# Patient Record
Sex: MALE | Race: BLACK OR AFRICAN AMERICAN | NOT HISPANIC OR LATINO | Employment: FULL TIME | URBAN - METROPOLITAN AREA
[De-identification: names, ages, dates, MRNs, and addresses within clinical notes are randomized per-mention and may not be internally consistent; named-entity substitution may affect disease eponyms.]

---

## 2017-01-17 ENCOUNTER — OFFICE VISIT (OUTPATIENT)
Dept: URGENT CARE | Facility: URGENT CARE | Age: 47
End: 2017-01-17
Payer: COMMERCIAL

## 2017-01-17 VITALS
WEIGHT: 165 LBS | HEART RATE: 84 BPM | DIASTOLIC BLOOD PRESSURE: 65 MMHG | TEMPERATURE: 99 F | SYSTOLIC BLOOD PRESSURE: 106 MMHG | BODY MASS INDEX: 23.68 KG/M2 | OXYGEN SATURATION: 98 %

## 2017-01-17 DIAGNOSIS — F17.200 TOBACCO USE DISORDER: ICD-10-CM

## 2017-01-17 DIAGNOSIS — R50.9 FEVER, UNSPECIFIED: ICD-10-CM

## 2017-01-17 DIAGNOSIS — R05.8 PRODUCTIVE COUGH: Primary | ICD-10-CM

## 2017-01-17 DIAGNOSIS — R06.2 WHEEZING: ICD-10-CM

## 2017-01-17 LAB
DEPRECATED S PYO AG THROAT QL EIA: NORMAL
FLUAV+FLUBV AG SPEC QL: NORMAL
FLUAV+FLUBV AG SPEC QL: NORMAL
MICRO REPORT STATUS: NORMAL
SPECIMEN SOURCE: NORMAL
SPECIMEN SOURCE: NORMAL

## 2017-01-17 PROCEDURE — 87880 STREP A ASSAY W/OPTIC: CPT | Performed by: PHYSICIAN ASSISTANT

## 2017-01-17 PROCEDURE — 87081 CULTURE SCREEN ONLY: CPT | Performed by: PHYSICIAN ASSISTANT

## 2017-01-17 PROCEDURE — 99214 OFFICE O/P EST MOD 30 MIN: CPT | Performed by: PHYSICIAN ASSISTANT

## 2017-01-17 PROCEDURE — 87804 INFLUENZA ASSAY W/OPTIC: CPT | Performed by: PHYSICIAN ASSISTANT

## 2017-01-17 RX ORDER — ALBUTEROL SULFATE 90 UG/1
2 AEROSOL, METERED RESPIRATORY (INHALATION) EVERY 6 HOURS PRN
Qty: 1 INHALER | Refills: 0 | Status: SHIPPED | OUTPATIENT
Start: 2017-01-17 | End: 2021-09-16

## 2017-01-17 RX ORDER — PREDNISONE 20 MG/1
40 TABLET ORAL DAILY
Qty: 10 TABLET | Refills: 0 | Status: SHIPPED | OUTPATIENT
Start: 2017-01-17 | End: 2017-01-22

## 2017-01-17 RX ORDER — SULFAMETHOXAZOLE/TRIMETHOPRIM 800-160 MG
1 TABLET ORAL 2 TIMES DAILY
Qty: 20 TABLET | Refills: 0 | Status: SHIPPED | OUTPATIENT
Start: 2017-01-17 | End: 2018-10-23

## 2017-01-17 NOTE — Clinical Note
[place] [facility]  [address] [address]  [phone]      January 17, 2017    RE:  Todd Bowden                                                                                                                                                       [address] [place] [address]            To whom it may concern:    Todd Bowden was seen in clinic today for illness.  He may return to work when he has been fever free for 24 hours.          Sincerely,        Ngoc Huertas    [place] [facility]

## 2017-01-18 NOTE — PROGRESS NOTES
SUBJECTIVE:   Todd Bowden is a 46 year old male presenting with a chief complaint of:  1) productive cough for 3 days  2) sore throat  3) body aches  4) ear pain    Onset of symptoms was 3 day(s) ago.  Course of illness is worsening.    Severity moderate  Current and Associated symptoms: as above  Treatment measures tried include as above.  Predisposing factors include tobacco abuse.    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     Skin desquamation 5/28/2013     Patient Active Problem List   Diagnosis     Insomnia     Stress-induced cardiomyopathy     CARDIOVASCULAR SCREENING; LDL GOAL LESS THAN 160     Tinea pedis     Smoking     HSV (herpes simplex virus) infection     Adjustment disorder with mixed anxiety and depressed mood     Social History   Substance Use Topics     Smoking status: Current Every Day Smoker -- 0.50 packs/day for 20 years     Types: Cigarettes     Smokeless tobacco: Never Used      Comment: on patches     Alcohol Use: No       ROS:  CONSTITUTIONAL:NEGATIVE for fever, chills, change in weight  INTEGUMENTARY/SKIN: NEGATIVE for worrisome rashes, moles or lesions  EYES: NEGATIVE for vision changes or irritation  ENT/MOUTH: as per HPI  RESP:as per HPI  CV: NEGATIVE for chest pain, palpitations or peripheral edema  GI: NEGATIVE for nausea, abdominal pain, heartburn, or change in bowel habits  MUSCULOSKELETAL: as per HPI    OBJECTIVE  :/65 mmHg  Pulse 84  Temp(Src) 99  F (37.2  C) (Oral)  Wt 165 lb (74.844 kg)  SpO2 98%  GENERAL APPEARANCE: healthy, alert and no distress  EYES: EOMI,  PERRL, conjunctiva  clear  HENT: ear canals and TM's normal.  Nose and mouth without ulcers, erythema or lesions  NECK: supple, nontender, no lymphadenopathy  RESP: a few scattered rhonchi and wheese  CV: regular rates and rhythm, normal S1 S2, no murmur noted  ABDOMEN:  soft, nontender, no HSM or masses and bowel sounds normal  NEURO: Normal strength and tone, sensory exam grossly normal,  normal speech and mentation  SKIN: no suspicious lesions or rashes    (R05) Productive cough  (primary encounter diagnosis)  Comment:   Plan: sulfamethoxazole-trimethoprim (BACTRIM         DS/SEPTRA DS) 800-160 MG per tablet            (R50.9) Fever, unspecified  Comment:   Plan: Influenza A/B antigen, Strep, Rapid Screen,         Beta strep group A culture            (R06.2) Wheezing  Comment:   Plan: predniSONE (DELTASONE) 20 MG tablet, albuterol         (PROAIR HFA/PROVENTIL HFA/VENTOLIN HFA) 108 (90        BASE) MCG/ACT Inhaler            (F17.200) Tobacco use disorder  Comment:   Plan: discussed tobacco cessation    F/U with PCP for re-check in 7-10 days, sooner should symptoms persist or worsen.    Patient expresses understanding and agreement with the assessment and plan as above.

## 2017-01-18 NOTE — NURSING NOTE
"Chief Complaint   Patient presents with     Urgent Care     x3 days fever cough, congestion, body aches, ear ache        Initial /65 mmHg  Pulse 84  Temp(Src) 99  F (37.2  C) (Oral)  Wt 165 lb (74.844 kg)  SpO2 98% Estimated body mass index is 23.68 kg/(m^2) as calculated from the following:    Height as of 10/14/14: 5' 10\" (1.778 m).    Weight as of this encounter: 165 lb (74.844 kg).  BP completed using cuff size: day Can MA      "

## 2017-01-19 LAB
BACTERIA SPEC CULT: NORMAL
MICRO REPORT STATUS: NORMAL
SPECIMEN SOURCE: NORMAL

## 2018-10-23 ENCOUNTER — OFFICE VISIT (OUTPATIENT)
Dept: URGENT CARE | Facility: URGENT CARE | Age: 48
End: 2018-10-23
Payer: COMMERCIAL

## 2018-10-23 VITALS
HEART RATE: 75 BPM | DIASTOLIC BLOOD PRESSURE: 66 MMHG | WEIGHT: 162.44 LBS | BODY MASS INDEX: 23.31 KG/M2 | SYSTOLIC BLOOD PRESSURE: 103 MMHG | TEMPERATURE: 97.2 F

## 2018-10-23 DIAGNOSIS — K21.00 GASTROESOPHAGEAL REFLUX DISEASE WITH ESOPHAGITIS: Primary | ICD-10-CM

## 2018-10-23 PROCEDURE — 99213 OFFICE O/P EST LOW 20 MIN: CPT | Performed by: PHYSICIAN ASSISTANT

## 2018-10-23 NOTE — PATIENT INSTRUCTIONS
(K21.0) Gastroesophageal reflux disease with esophagitis  (primary encounter diagnosis)  Comment: likely secondary to clindamycin and ibuprofen.    Plan: ranitidine (ZANTAC) 150 MG tablet        Take pills with food.  Avoid lying down for at least 30 minutes after taking the medication.     Follow up with your primary clinic for re-check     Also, follow up with dentist to ensure the dental infection is cleared.

## 2018-10-23 NOTE — LETTER
[place] [facility]  [address] [address]  [phone]      October 23, 2018    RE:  Todd Bowden                                                                                                                                                       [address] [facility] [address]            To whom it may concern:    Todd Bowden was seen in clinic today for illness.  He has been ill for past 2 days.   He may return to work tomorrow.          Sincerely,        Ngoc Huertas [facility]

## 2018-10-23 NOTE — MR AVS SNAPSHOT
After Visit Summary   10/23/2018    Todd Bowden    MRN: [id number]           Patient Information     Date Of Birth          1970        Visit Information        Provider Department      10/23/2018 1:00 PM Ngoc Bolton PA-C [facility]        Today's Diagnoses     Gastroesophageal reflux disease with esophagitis    -  1      Care Instructions    (K21.0) Gastroesophageal reflux disease with esophagitis  (primary encounter diagnosis)  Comment: likely secondary to clindamycin and ibuprofen.    Plan: ranitidine (ZANTAC) 150 MG tablet        Take pills with food.  Avoid lying down for at least 30 minutes after taking the medication.     Follow up with your primary clinic for re-check     Also, follow up with dentist to ensure the dental infection is cleared.                    Follow-ups after your visit        Who to contact     If you have questions or need follow up information about today's clinic visit or your schedule please contact [facility] directly at [phone].  Normal or non-critical lab and imaging results will be communicated to you by [employer], letter or phone within 4 business days after the clinic has received the results. If you do not hear from us within 7 days, please contact the clinic through [employer] or phone. If you have a critical or abnormal lab result, we will notify you by phone as soon as possible.  Submit refill requests through [employer] or call your pharmacy and they will forward the refill request to us. Please allow 3 business days for your refill to be completed.          Additional Information About Your Visit        [employer] Information     [employer] gives you secure access to your electronic health record. If you see a primary care provider, you can also send messages to your care team and make appointments. If you have questions, please call your primary care clinic.  If you do not have a primary  care provider, please call [phone] and they will assist you.        Care EveryWhere ID     This is your Care EveryWhere ID. This could be used by other organizations to access your [place] medical records  [id number]        Your Vitals Were     Pulse Temperature BMI (Body Mass Index)             75 97.2  F (36.2  C) (Oral) 23.31 kg/m2          Blood Pressure from Last 3 Encounters:   10/23/18 103/66   01/17/17 106/65   11/14/16 92/60    Weight from Last 3 Encounters:   10/23/18 162 lb 7 oz (73.7 kg)   01/17/17 165 lb (74.8 kg)   11/14/16 160 lb 12.8 oz (72.9 kg)              Today, you had the following     No orders found for display         Today's Medication Changes          These changes are accurate as of 10/23/18  2:07 PM.  If you have any questions, ask your nurse or doctor.               Start taking these medicines.        Dose/Directions    ranitidine 150 MG tablet   Commonly known as:  ZANTAC   Used for:  Gastroesophageal reflux disease with esophagitis   Started by:  Ngoc Bolton PA-C        Dose:  150 mg   Take 1 tablet (150 mg) by mouth 2 times daily   Quantity:  60 tablet   Refills:  0         These medicines have changed or have updated prescriptions.        Dose/Directions    albuterol 108 (90 Base) MCG/ACT inhaler   Commonly known as:  PROAIR HFA/PROVENTIL HFA/VENTOLIN HFA   This may have changed:  Another medication with the same name was removed. Continue taking this medication, and follow the directions you see here.   Used for:  Wheezing   Changed by:  Ngoc Bolton PA-C        Dose:  2 puff   Inhale 2 puffs into the lungs every 6 hours as needed for shortness of breath / dyspnea or wheezing   Quantity:  1 Inhaler   Refills:  0         Stop taking these medicines if you haven't already. Please contact your care team if you have questions.     doxycycline 100 MG capsule   Commonly known as:  VIBRAMYCIN   Stopped by:  Ngoc Bolton PA-C            predniSONE 20 MG tablet   Commonly known as:  DELTASONE   Stopped by:  Ngoc Bolton PA-C           sulfamethoxazole-trimethoprim 800-160 MG per tablet   Commonly known as:  BACTRIM DS/SEPTRA DS   Stopped by:  Ngoc Bolton PA-C           zolpidem 10 MG tablet   Commonly known as:  AMBIEN   Stopped by:  Ngoc Bolton PA-C                Where to get your medicines      These medications were sent to [email or web address] [address] [place] [address] AT [address] [address]    Hours:  24-hours Phone:  [phone]     ranitidine 150 MG tablet                Primary Care Provider Office Phone # Fax #    Floyd Balderas [phone] [address] [facility] [address]  [place] [address]        Equal Access to Services     [facility] Center[facility] : Hadii aad ku hadasho Soomaali, waaxda luqadaha, qaybta kaalmada adeegyada, waxay zainin hayaan christel mendoza . So [facility] [place] [phone].    ATENCIÓN: Si habla español, tiene a wild disposición servicios gratuitos de asistencia lingüística. Merlin al [phone].    We comply with applicable federal civil rights laws and [place] laws. We do not discriminate on the basis of race, color, national origin, age, disability, sex, sexual orientation, or gender identity.            Thank you!     Thank you for choosing [facility]  for your care. Our goal is always to provide you with excellent care. Hearing back from our patients is one way we can continue to improve our services. Please take a few minutes to complete the written survey that you may receive in the mail after your visit with us. Thank you!             Your Updated Medication List - Protect others around you: Learn how to safely use, store and throw away your medicines at www.disposemymeds.org.          This list is accurate as of 10/23/18  2:07 PM.  Always use your most recent med list.                    Brand Name Dispense Instructions for use Diagnosis    acyclovir 400 MG tablet    ZOVIRAX    15 tablet    Take 1 tablet (400 mg) by mouth 3 times daily    HSV (herpes simplex virus) infection       albuterol 108 (90 Base) MCG/ACT inhaler    PROAIR HFA/PROVENTIL HFA/VENTOLIN HFA    1 Inhaler    Inhale 2 puffs into the lungs every 6 hours as needed for shortness of breath / dyspnea or wheezing    Wheezing       buPROPion 150 MG 12 hr tablet    WELLBUTRIN SR    60 tablet    Take 1 tablet once daily and increase to 1 tablet twice daily after 4 to 7 days    Tobacco dependence       HYDROcodone-acetaminophen 5-325 MG per tablet    NORCO    15 tablet    Take 1-2 tablets by mouth every 4 hours as needed for moderate to severe pain        ibuprofen 200 MG tablet    ADVIL/MOTRIN    120 tablet    Take 2-4 tablets (400-800 mg) by mouth 3 times daily as needed For arthritis pain.    Arthritis       nicotine 21 MG/24HR 24 hr patch    NICODERM CQ    30 patch    Place 1 patch onto the skin every 24 hours    Tobacco dependence       nicotine polacrilex 2 MG gum    EQL NICOTINE POLACRILEX    30 tablet    Place 1 each (2 mg) inside cheek as needed for smoking cessation    Tobacco dependence       ranitidine 150 MG tablet    ZANTAC    60 tablet    Take 1 tablet (150 mg) by mouth 2 times daily    Gastroesophageal reflux disease with esophagitis

## 2018-10-23 NOTE — PROGRESS NOTES
SUBJECTIVE:   Todd Bowden is a 48 year old male presenting with a chief complaint of presenting with a chief complaint of:  Chest discomfort, a burning feeling, like something is stuck in his chest since yesterday.  He has been on ibuprofen 800mg and clindamycin 300mg QID since 10/15/18  Tooth was extracted on 10/17    Denies any fevers, but does complain of chills alternating with subjective hot flashes.          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     Sepsis(995.91) 4/26/2013     Sickle cell anemia (H)     screening performed at [facility] were negative 04/2013     Skin desquamation 5/28/2013     Stress-induced cardiomyopathy 5/10/2013     Patient Active Problem List   Diagnosis     Insomnia     Stress-induced cardiomyopathy     CARDIOVASCULAR SCREENING; LDL GOAL LESS THAN 160     Tinea pedis     Smoking     HSV (herpes simplex virus) infection     Adjustment disorder with mixed anxiety and depressed mood     Social History   Substance Use Topics     Smoking status: Current Every Day Smoker     Packs/day: 0.50     Years: 20.00     Types: Cigarettes     Smokeless tobacco: Never Used      Comment: on patches     Alcohol use No           ROS:  CONSTITUTIONAL:as per HPI  INTEGUMENTARY/SKIN: NEGATIVE for worrisome rashes, moles or lesions  EYES: NEGATIVE for vision changes or irritation  ENT/MOUTH: as per HPI  RESP:NEGATIVE for significant cough or SOB  CV: NEGATIVE for chest pain, palpitations or peripheral edema  GI: as per HPI  MUSCULOSKELETAL: NEGATIVE for significant arthralgias or myalgia  NEURO: NEGATIVE for weakness, dizziness or paresthesias  Review of systems negative except as above      OBJECTIVE  :/66  Pulse 75  Temp 97.2  F (36.2  C) (Oral)  Wt 162 lb 7 oz (73.7 kg)   BMI 23.31 kg/m2       GENERAL APPEARANCE: healthy, alert and no distress  EYES: EOMI,  PERRL, conjunctiva clear  HENT: ear canals and TM's normal.  Nose and mouth without ulcers, erythema or lesions  NECK: supple, nontender, no lymphadenopathy  RESP: lungs clear to auscultation - no rales, rhonchi or wheezes  CV: regular rates and rhythm, normal S1 S2, no murmur noted  ABDOMEN:  soft, nontender, no HSM or masses and bowel sounds normal  ABDOMEN: tenderness epigastric  NEURO: Normal strength and tone, sensory exam grossly normal,  normal speech and mentation  SKIN: no suspicious lesions or rashes    (K21.0) Gastroesophageal reflux disease with esophagitis  (primary encounter diagnosis)  Comment: likely secondary to clindamycin and ibuprofen.    Plan: ranitidine (ZANTAC) 150 MG tablet        Take pills with food.  Avoid lying down for at least 30 minutes after taking the medication.     Follow up with your primary clinic for re-check     Also, follow up with dentist to ensure the dental infection is cleared.

## 2020-03-02 ENCOUNTER — HEALTH MAINTENANCE LETTER (OUTPATIENT)
Age: 50
End: 2020-03-02

## 2021-09-16 ENCOUNTER — ANCILLARY PROCEDURE (OUTPATIENT)
Dept: GENERAL RADIOLOGY | Facility: CLINIC | Age: 51
End: 2021-09-16
Attending: NURSE PRACTITIONER
Payer: COMMERCIAL

## 2021-09-16 ENCOUNTER — OFFICE VISIT (OUTPATIENT)
Dept: URGENT CARE | Facility: URGENT CARE | Age: 51
End: 2021-09-16
Payer: COMMERCIAL

## 2021-09-16 VITALS
HEART RATE: 91 BPM | OXYGEN SATURATION: 98 % | SYSTOLIC BLOOD PRESSURE: 120 MMHG | DIASTOLIC BLOOD PRESSURE: 75 MMHG | TEMPERATURE: 98.2 F | RESPIRATION RATE: 11 BRPM

## 2021-09-16 DIAGNOSIS — Z11.3 SCREEN FOR STD (SEXUALLY TRANSMITTED DISEASE): ICD-10-CM

## 2021-09-16 DIAGNOSIS — R07.9 ACUTE CHEST PAIN: ICD-10-CM

## 2021-09-16 DIAGNOSIS — R07.9 ACUTE CHEST PAIN: Primary | ICD-10-CM

## 2021-09-16 DIAGNOSIS — K02.9 DENTAL CARIES: ICD-10-CM

## 2021-09-16 DIAGNOSIS — K04.7 DENTAL INFECTION: ICD-10-CM

## 2021-09-16 LAB
ALBUMIN SERPL-MCNC: 4.3 G/DL (ref 3.4–5)
ALP SERPL-CCNC: 75 U/L (ref 40–150)
ALT SERPL W P-5'-P-CCNC: 37 U/L (ref 0–70)
ANION GAP SERPL CALCULATED.3IONS-SCNC: 3 MMOL/L (ref 3–14)
AST SERPL W P-5'-P-CCNC: 20 U/L (ref 0–45)
BASOPHILS # BLD AUTO: 0 10E3/UL (ref 0–0.2)
BASOPHILS NFR BLD AUTO: 0 %
BILIRUB SERPL-MCNC: 0.4 MG/DL (ref 0.2–1.3)
BUN SERPL-MCNC: 7 MG/DL (ref 7–30)
CALCIUM SERPL-MCNC: 9.7 MG/DL (ref 8.5–10.1)
CHLORIDE BLD-SCNC: 108 MMOL/L (ref 94–109)
CO2 SERPL-SCNC: 29 MMOL/L (ref 20–32)
CREAT SERPL-MCNC: 0.87 MG/DL (ref 0.66–1.25)
D DIMER PPP FEU-MCNC: 0.31 UG/ML FEU (ref 0–0.5)
EOSINOPHIL # BLD AUTO: 0.1 10E3/UL (ref 0–0.7)
EOSINOPHIL NFR BLD AUTO: 2 %
ERYTHROCYTE [DISTWIDTH] IN BLOOD BY AUTOMATED COUNT: 12.4 % (ref 10–15)
GFR SERPL CREATININE-BSD FRML MDRD: >90 ML/MIN/1.73M2
GLUCOSE BLD-MCNC: 97 MG/DL (ref 70–99)
HCT VFR BLD AUTO: 46.9 % (ref 40–53)
HGB BLD-MCNC: 15.8 G/DL (ref 13.3–17.7)
LYMPHOCYTES # BLD AUTO: 1.5 10E3/UL (ref 0.8–5.3)
LYMPHOCYTES NFR BLD AUTO: 40 %
MCH RBC QN AUTO: 33.2 PG (ref 26.5–33)
MCHC RBC AUTO-ENTMCNC: 33.7 G/DL (ref 31.5–36.5)
MCV RBC AUTO: 99 FL (ref 78–100)
MONOCYTES # BLD AUTO: 0.5 10E3/UL (ref 0–1.3)
MONOCYTES NFR BLD AUTO: 12 %
NEUTROPHILS # BLD AUTO: 1.7 10E3/UL (ref 1.6–8.3)
NEUTROPHILS NFR BLD AUTO: 45 %
PLATELET # BLD AUTO: 189 10E3/UL (ref 150–450)
POTASSIUM BLD-SCNC: 5.2 MMOL/L (ref 3.4–5.3)
PROT SERPL-MCNC: 8 G/DL (ref 6.8–8.8)
RBC # BLD AUTO: 4.76 10E6/UL (ref 4.4–5.9)
SODIUM SERPL-SCNC: 140 MMOL/L (ref 133–144)
TROPONIN I SERPL-MCNC: <0.015 UG/L (ref 0–0.04)
WBC # BLD AUTO: 3.7 10E3/UL (ref 4–11)

## 2021-09-16 PROCEDURE — 87491 CHLMYD TRACH DNA AMP PROBE: CPT | Performed by: NURSE PRACTITIONER

## 2021-09-16 PROCEDURE — 87591 N.GONORRHOEAE DNA AMP PROB: CPT | Performed by: NURSE PRACTITIONER

## 2021-09-16 PROCEDURE — 86803 HEPATITIS C AB TEST: CPT | Performed by: NURSE PRACTITIONER

## 2021-09-16 PROCEDURE — 71046 X-RAY EXAM CHEST 2 VIEWS: CPT | Performed by: RADIOLOGY

## 2021-09-16 PROCEDURE — 99417 PROLNG OP E/M EACH 15 MIN: CPT | Performed by: NURSE PRACTITIONER

## 2021-09-16 PROCEDURE — 93000 ELECTROCARDIOGRAM COMPLETE: CPT | Performed by: NURSE PRACTITIONER

## 2021-09-16 PROCEDURE — 87340 HEPATITIS B SURFACE AG IA: CPT | Performed by: NURSE PRACTITIONER

## 2021-09-16 PROCEDURE — 85025 COMPLETE CBC W/AUTO DIFF WBC: CPT | Performed by: NURSE PRACTITIONER

## 2021-09-16 PROCEDURE — 99215 OFFICE O/P EST HI 40 MIN: CPT | Performed by: NURSE PRACTITIONER

## 2021-09-16 PROCEDURE — 84484 ASSAY OF TROPONIN QUANT: CPT | Performed by: NURSE PRACTITIONER

## 2021-09-16 PROCEDURE — 80053 COMPREHEN METABOLIC PANEL: CPT | Performed by: NURSE PRACTITIONER

## 2021-09-16 PROCEDURE — 86780 TREPONEMA PALLIDUM: CPT | Performed by: NURSE PRACTITIONER

## 2021-09-16 PROCEDURE — 36415 COLL VENOUS BLD VENIPUNCTURE: CPT | Performed by: NURSE PRACTITIONER

## 2021-09-16 PROCEDURE — 85379 FIBRIN DEGRADATION QUANT: CPT | Performed by: NURSE PRACTITIONER

## 2021-09-16 PROCEDURE — 87389 HIV-1 AG W/HIV-1&-2 AB AG IA: CPT | Performed by: NURSE PRACTITIONER

## 2021-09-16 RX ORDER — AMOXICILLIN 500 MG/1
CAPSULE ORAL
COMMUNITY
Start: 2021-09-13 | End: 2022-02-06

## 2021-09-16 RX ORDER — IBUPROFEN 800 MG/1
TABLET, FILM COATED ORAL
COMMUNITY
Start: 2021-09-13

## 2021-09-16 RX ORDER — FLUTICASONE PROPIONATE 50 MCG
SPRAY, SUSPENSION (ML) NASAL
COMMUNITY
Start: 2021-09-05 | End: 2022-03-08

## 2021-09-16 NOTE — LETTER
September 16, 2021      Todd Bowden  [address] [facility] [address]        To Whom It May Concern:    Todd Bowden  was seen on 9/16/2021.  Please excuse him  until 9/18/2021 due to illness.        Sincerely,        Nasra Jaimes, CNP

## 2021-09-16 NOTE — PROGRESS NOTES
Chief Complaint   Patient presents with     Urgent Care     chest pain, tingling down both arms, reports his legs fell asleep while on toilet today          ICD-10-CM    1. Acute chest pain  R07.9 EKG 12-lead complete w/read - Clinics     Troponin I     D dimer, quantitative     CBC with platelets and differential     Comprehensive metabolic panel (BMP + Alb, Alk Phos, ALT, AST, Total. Bili, TP)     XR Chest 2 Views     Troponin I     D dimer, quantitative     CBC with platelets and differential     Comprehensive metabolic panel (BMP + Alb, Alk Phos, ALT, AST, Total. Bili, TP)   2. Screen for STD (sexually transmitted disease)  Z11.3 NEISSERIA GONORRHOEA PCR     CHLAMYDIA TRACHOMATIS PCR     Treponema Abs w Reflex to RPR and Titer     HIV Antigen Antibody Combo     Hepatitis B surface antigen     Hepatitis C antibody     CHLAMYDIA TRACHOMATIS PCR     NEISSERIA GONORRHOEA PCR     Treponema Abs w Reflex to RPR and Titer     HIV Antigen Antibody Combo     Hepatitis B surface antigen     Hepatitis C antibody   3. Dental caries  K02.9 amoxicillin-clavulanate (AUGMENTIN) 875-125 MG tablet   4. Dental infection  K04.7 amoxicillin-clavulanate (AUGMENTIN) 875-125 MG tablet   Stop Amoxicillin. Start Amoxicillin/CLavulanate, finish all medication.  Warm compresses to the left neck.  May try applying ice to the face and jaw where you are having pain from the teeth.  Tylenol and/or ibuprofen as needed for pain    If chest pain recurs please go to the ER for further evaluation.    82 minutes spent on the date of the encounter doing chart review, history and exam, documentation and further activities per the note    Medical Decision Making    Differential Diagnosis includes but is not limited to:  Pulmonary embolism, aortic dissection, pleurisy, myocardial infarction, angina, pneumonia, upper respiratory infection    Dental caries, dental infection, pharyngitis, lymphadenitis, lymphoma, viral infection,    Gonorrhea, chlamydia,  syphilis, HIV, hepatitis B, hepatitis C,    Xray - Reviewed and interpreted by me.  Chest x-ray shows no acute changes, no infiltrates or effusions.  Normal heart size.  EKG - Reviewed and interpreted by me appears normal, NSR, with no acute changes    Results for orders placed or performed in visit on 09/16/21 (from the past 24 hour(s))   Troponin I   Result Value Ref Range    Troponin I <0.015 0.000 - 0.045 ug/L   D dimer, quantitative   Result Value Ref Range    D-Dimer Quantitative 0.31 0.00 - 0.50 ug/mL FEU    Narrative    This D-dimer assay is intended for use in conjunction with a clinical pretest probability assessment model to exclude pulmonary embolism (PE) and deep venous thrombosis (DVT) in outpatients suspected of PE or DVT. The cut-off value is 0.50 ug/mL FEU.   CBC with platelets and differential    Narrative    The following orders were created for panel order CBC with platelets and differential.  Procedure                               Abnormality         Status                     ---------                               -----------         ------                     CBC with platelets and d...[[id number]]  Abnormal            Final result                 Please view results for these tests on the individual orders.   Comprehensive metabolic panel (BMP + Alb, Alk Phos, ALT, AST, Total. Bili, TP)   Result Value Ref Range    Sodium 140 133 - 144 mmol/L    Potassium 5.2 3.4 - 5.3 mmol/L    Chloride 108 94 - 109 mmol/L    Carbon Dioxide (CO2) 29 20 - 32 mmol/L    Anion Gap 3 3 - 14 mmol/L    Urea Nitrogen 7 7 - 30 mg/dL    Creatinine 0.87 0.66 - 1.25 mg/dL    Calcium 9.7 8.5 - 10.1 mg/dL    Glucose 97 70 - 99 mg/dL    Alkaline Phosphatase 75 40 - 150 U/L    AST 20 0 - 45 U/L    ALT 37 0 - 70 U/L    Protein Total 8.0 6.8 - 8.8 g/dL    Albumin 4.3 3.4 - 5.0 g/dL    Bilirubin Total 0.4 0.2 - 1.3 mg/dL    GFR Estimate >90 >60 mL/min/1.73m2   CBC with platelets and differential   Result Value Ref Range    WBC  Count 3.7 (L) 4.0 - 11.0 10e3/uL    RBC Count 4.76 4.40 - 5.90 10e6/uL    Hemoglobin 15.8 13.3 - 17.7 g/dL    Hematocrit 46.9 40.0 - 53.0 %    MCV 99 78 - 100 fL    MCH 33.2 (H) 26.5 - 33.0 pg    MCHC 33.7 31.5 - 36.5 g/dL    RDW 12.4 10.0 - 15.0 %    Platelet Count 189 150 - 450 10e3/uL    % Neutrophils 45 %    % Lymphocytes 40 %    % Monocytes 12 %    % Eosinophils 2 %    % Basophils 0 %    Absolute Neutrophils 1.7 1.6 - 8.3 10e3/uL    Absolute Lymphocytes 1.5 0.8 - 5.3 10e3/uL    Absolute Monocytes 0.5 0.0 - 1.3 10e3/uL    Absolute Eosinophils 0.1 0.0 - 0.7 10e3/uL    Absolute Basophils 0.0 0.0 - 0.2 10e3/uL       Subjective     Todd Bowden is an 51 year old male who presents to clinic today for chest tightness that has been intermittent since 4 days ago. He had two teeth pulled on Monday and wonders if this was related.    He was put on amoxicillin prior to and after the teeth extractions but is having more pain in and adjacent to tooth that also has dental caries.    He also would like to be screened for STDs after recently finding out that a partner had been unfaithful.     ROS: 10 point ROS neg other than the symptoms noted above in the HPI.       Objective    /75   Pulse 91   Temp 98.2  F (36.8  C) (Tympanic)   Resp 11   SpO2 98%     Physical Exam       GENERAL APPEARANCE: healthy appearing, alert     EYES: PERRL, EOMI, sclera non-icteric     HENT: ear canals and TM's normal, nose exam is normal, multiple missing teeth with evidence of recent extractions, swollen tender gums and teeth that still need repair     NECK: Large swollen left anterior nodes, tender to palpation     RESP: lungs clear to auscultation - no rales, rhonchi or wheezes     CV: regular rates and rhythm, no murmurs, rubs, or gallop     ABDOMEN:  soft, nontender, no HSM or masses and bowel sounds normal     MS: extremities normal- no gross deformities noted; normal muscle tone.     SKIN: no suspicious lesions or rashes      NEURO: Normal strength and tone, mentation intact and speech normal     PSYCH: normal thought process; no significant mood disturbance    Patient Instructions   Stop Amoxicillin.     Start Amoxicillin/CLavulanate, finish all medication.    Warm compresses to the left neck.    May try applying ice to the face and jaw where you are having pain from the teeth    Acetaminophen (Tylenol) may be taken up to 4000mg daily (3000mg if over 65) which would be 2 regular strength tablets (325mg) or two extra strength tablets(500mg) up to 4 times a day (3 times a day if over 65).   Check for acetaminophen in other OTC or prescription medications and be sure you add this in the maximum amount you take every day.    Too much acetaminophen can lead to serious liver damage. DO NOT TAKE if you have a history of liver disease.    Ibuprofen 200mg tablets may be taken up to 4 pills 4 times a day(three times a day if over 65)  to the maximum of 3200mg,  (2400mg if >65)  daily as needed for pain.   Take with food.  Don't take with aspirin, Aleve or other antiinflammatory medication or with warfarin. DO NOT TAKE if you have a history of kidney disease.    If chest pain recurs please go to the ER for further evaluation.      MIKE Razo, CNP  [facility] Provider

## 2021-09-17 LAB
HBV SURFACE AG SERPL QL IA: NONREACTIVE
HCV AB SERPL QL IA: NONREACTIVE
HIV 1+2 AB+HIV1 P24 AG SERPL QL IA: NONREACTIVE
T PALLIDUM AB SER QL: NONREACTIVE

## 2021-09-17 NOTE — PATIENT INSTRUCTIONS
Stop Amoxicillin.     Start Amoxicillin/CLavulanate, finish all medication.    Warm compresses to the left neck.    May try applying ice to the face and jaw where you are having pain from the teeth    Acetaminophen (Tylenol) may be taken up to 4000mg daily (3000mg if over 65) which would be 2 regular strength tablets (325mg) or two extra strength tablets(500mg) up to 4 times a day (3 times a day if over 65).   Check for acetaminophen in other OTC or prescription medications and be sure you add this in the maximum amount you take every day.    Too much acetaminophen can lead to serious liver damage. DO NOT TAKE if you have a history of liver disease.    Ibuprofen 200mg tablets may be taken up to 4 pills 4 times a day(three times a day if over 65)  to the maximum of 3200mg,  (2400mg if >65)  daily as needed for pain.   Take with food.  Don't take with aspirin, Aleve or other antiinflammatory medication or with warfarin. DO NOT TAKE if you have a history of kidney disease.    If chest pain recurs please go to the ER for further evaluation.

## 2021-09-18 LAB
C TRACH DNA SPEC QL NAA+PROBE: NEGATIVE
N GONORRHOEA DNA SPEC QL NAA+PROBE: NEGATIVE

## 2021-12-01 ENCOUNTER — OFFICE VISIT (OUTPATIENT)
Dept: URGENT CARE | Facility: URGENT CARE | Age: 51
End: 2021-12-01
Payer: COMMERCIAL

## 2021-12-01 VITALS
OXYGEN SATURATION: 99 % | RESPIRATION RATE: 16 BRPM | SYSTOLIC BLOOD PRESSURE: 128 MMHG | HEART RATE: 81 BPM | DIASTOLIC BLOOD PRESSURE: 82 MMHG | TEMPERATURE: 98.7 F

## 2021-12-01 DIAGNOSIS — J01.90 ACUTE SINUSITIS WITH SYMPTOMS > 10 DAYS: ICD-10-CM

## 2021-12-01 DIAGNOSIS — J30.89 NON-SEASONAL ALLERGIC RHINITIS, UNSPECIFIED TRIGGER: Primary | ICD-10-CM

## 2021-12-01 PROCEDURE — 99214 OFFICE O/P EST MOD 30 MIN: CPT | Performed by: PHYSICIAN ASSISTANT

## 2021-12-01 RX ORDER — DOXYCYCLINE 100 MG/1
100 CAPSULE ORAL 2 TIMES DAILY
Qty: 14 CAPSULE | Refills: 0 | Status: SHIPPED | OUTPATIENT
Start: 2021-12-01 | End: 2021-12-08

## 2021-12-01 RX ORDER — AZELASTINE 1 MG/ML
1 SPRAY, METERED NASAL 2 TIMES DAILY
Qty: 30 ML | Refills: 0 | Status: SHIPPED | OUTPATIENT
Start: 2021-12-01 | End: 2021-12-31

## 2021-12-01 NOTE — PROGRESS NOTES
URGENT CARE VISIT:    SUBJECTIVE:   Todd Bowden is a 51 year old male presenting with a chief complaint of stuffy nose, facial pain/pressure and burning eyes.  Onset was 2 week(s) ago.   He denies the following symptoms: fever, chills, vomiting and diarrhea  Course of illness is waxing and waning.    Treatment measures tried include flonase nasal spray with no relief of symptoms.  Predisposing factors include none. He believes symptoms could be due to black mold in apartment.    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     Sepsis, unspecified 4/26/2013     Sickle cell anemia (H)     screening performed at [facility] were negative 04/2013     Skin desquamation 5/28/2013     Stress-induced cardiomyopathy 5/10/2013     Allergies: Erythromycin and Lactose   Medications:   Current Outpatient Medications   Medication Sig Dispense Refill     azelastine (ASTELIN) 0.1 % nasal spray Spray 1 spray into both nostrils 2 times daily 30 mL 0     doxycycline hyclate (VIBRAMYCIN) 100 MG capsule Take 1 capsule (100 mg) by mouth 2 times daily for 7 days 14 capsule 0     fluticasone (FLONASE) 50 MCG/ACT nasal spray SHAKE LIQUID AND USE 1 SPRAY IN EACH NOSTRIL EVERY DAY       ibuprofen (ADVIL/MOTRIN) 800 MG tablet TAKE 1 TABLET BY MOUTH EVERY 8 HOURS AS NEEDED FOR PAIN       amoxicillin (AMOXIL) 500 MG capsule TAKE 1 CAPSULE BY MOUTH THREE TIMES DAILY UNTIL ALL TAKEN (Patient not taking: Reported on 12/1/2021)       amoxicillin-clavulanate (AUGMENTIN) 875-125 MG tablet Take 1 tablet by mouth 2 times daily (Patient not taking: Reported on 12/1/2021) 20 tablet 0     buPROPion (WELLBUTRIN SR) 150 MG 12 hr tablet Take 1 tablet once daily and increase to 1 tablet twice daily after 4  to 7 days (Patient not taking: Reported on 10/23/2018) 60 tablet 2     Social History:   Social History     Tobacco Use     Smoking status: Current Every Day Smoker     Packs/day: 0.50     Years: 20.00     Pack years: 10.00     Types: Cigarettes     Smokeless tobacco: Never Used     Tobacco comment: on patches   Substance Use Topics     Alcohol use: No       ROS:  General: negative  Skin: negative  Eyes: burning  Ears/Nose/Throat: nasal congestion  Respiratory: No shortness of breath, dyspnea on exertion, cough, or hemoptysis  Cardiovascular: negative  Gastrointestinal: negative  Genitourinary: negative  Musculoskeletal: negative  Neurologic: negative    OBJECTIVE:  /82   Pulse 81   Temp 98.7  F (37.1  C) (Oral)   Resp 16   SpO2 99%   GENERAL APPEARANCE: healthy, alert and no distress  EYES: EOMI,  PERRL, conjunctiva clear  HENT: ear canals and TM's normal.  Nose and mouth without ulcers, erythema or lesions  NECK: supple, nontender, no lymphadenopathy  RESP: lungs clear to auscultation - no rales, rhonchi or wheezes  CV: regular rates and rhythm, normal S1 S2, no murmur noted  SKIN: no suspicious lesions or rashes      ASSESSMENT:    ICD-10-CM    1. Non-seasonal allergic rhinitis, unspecified trigger  J30.89 azelastine (ASTELIN) 0.1 % nasal spray   2. Acute sinusitis with symptoms > 10 days  J01.90 doxycycline hyclate (VIBRAMYCIN) 100 MG capsule       PLAN:  Patient Instructions   Patient was educated on the natural course of condition.  Take medications as prescribed. Side effects may include stomachache or diarrhea. Conservative measures discussed including increased fluids, nasal saline irrigation (neti pot), warm steamy shower, cough suppressants, expectorants (Mucinex), decongestants (Pseudofed), and analgesics (Tylenol and/or Ibuprofen). Continue using Flonase nasal spray. Will addSee your primary care provider if symptoms worsen or do not improve in 7 days. Seek emergency care if you develop fever  over 103 or shortness of breath.    Patient verbalized understanding and is agreeable to plan. The patient was discharged ambulatory and in stable condition.    Janet Davenport PA-C ....................  12/1/2021   5:41 PM

## 2021-12-01 NOTE — PATIENT INSTRUCTIONS
Patient was educated on the natural course of condition. Likely allergic rhinitis. Take medications as prescribed. Side effects may include stomachache or diarrhea. Continue using Flonase nasal spray. Will add Azelastine.  Conservative measures discussed including increased fluids, nasal saline irrigation (neti pot), warm steamy shower, cough suppressants, expectorants (Mucinex), decongestants (Pseudofed), and analgesics (Tylenol and/or Ibuprofen). See your primary care provider if symptoms worsen or do not improve in 7 days. Seek emergency care if you develop fever over 103 or shortness of breath.

## 2021-12-01 NOTE — LETTER
[address] [address]  Phone: [phone]    December 1, 2021      RE: Todd Bowden  [address] [address]       To whom it may concern:    Todd Bowden was seen in our clinic today. Please excuse him from work until 12/3/2021 .    Sincerely,      Janet Davenport PA-C

## 2021-12-08 ENCOUNTER — OFFICE VISIT (OUTPATIENT)
Dept: URGENT CARE | Facility: URGENT CARE | Age: 51
End: 2021-12-08
Payer: COMMERCIAL

## 2021-12-08 VITALS
SYSTOLIC BLOOD PRESSURE: 103 MMHG | DIASTOLIC BLOOD PRESSURE: 72 MMHG | HEART RATE: 91 BPM | TEMPERATURE: 97.9 F | OXYGEN SATURATION: 98 % | RESPIRATION RATE: 12 BRPM

## 2021-12-08 DIAGNOSIS — J30.9 ALLERGIC RHINITIS, UNSPECIFIED SEASONALITY, UNSPECIFIED TRIGGER: Primary | ICD-10-CM

## 2021-12-08 DIAGNOSIS — H93.8X3 EAR FULLNESS, BILATERAL: ICD-10-CM

## 2021-12-08 PROCEDURE — 99214 OFFICE O/P EST MOD 30 MIN: CPT | Performed by: PHYSICIAN ASSISTANT

## 2021-12-08 RX ORDER — OXYMETAZOLINE HYDROCHLORIDE 0.05 G/100ML
2 SPRAY NASAL 2 TIMES DAILY PRN
Qty: 14.7 ML | Refills: 0 | Status: SHIPPED | OUTPATIENT
Start: 2021-12-08 | End: 2021-12-11

## 2021-12-08 RX ORDER — ECHINACEA PURPUREA EXTRACT 125 MG
TABLET ORAL
Qty: 15 ML | Refills: 0 | Status: SHIPPED | OUTPATIENT
Start: 2021-12-08 | End: 2022-04-28

## 2021-12-08 RX ORDER — DIPHENHYDRAMINE HCL 25 MG
25 TABLET ORAL EVERY 6 HOURS PRN
Qty: 16 TABLET | Refills: 0 | Status: SHIPPED | OUTPATIENT
Start: 2021-12-08

## 2021-12-08 NOTE — PROGRESS NOTES
Assessment & Plan     Allergic rhinitis, unspecified seasonality, unspecified trigger  Patient reports symptoms worse with exposure to home, removed when outside of the home, suggesting additional treatment with topical decongestants, H1 blocker for bedtime use, and nasal saline spray for dry nares.  Advised if no improvement in symptoms over the course of the next week to follow-up with primary care provider.  - oxymetazoline (AFRIN) 0.05 % nasal spray  Dispense: 14.7 mL; Refill: 0  - diphenhydrAMINE (BENADRYL) 25 MG tablet  Dispense: 16 tablet; Refill: 0  - sodium chloride (OCEAN) 0.65 % nasal spray  Dispense: 15 mL; Refill: 0    Ear fullness, bilateral  Suspect this is related to allergic rhinitis and eustachian tube dysfunction, advised use of oxymetazoline  - oxymetazoline (AFRIN) 0.05 % nasal spray  Dispense: 14.7 mL; Refill: 0     I spent a total of 30 minutes on the day of the visit.   Time spent doing chart review, history and exam, documentation and further activities per the note      Return in about 1 week (around 12/15/2021) for reevaluation with PCP if symptoms not improving.    Galen Land PA-C  [facility] NICO Brooks is a 51 year old male who presents to clinic today for the following health issues:  Chief Complaint   Patient presents with     Urgent Care     Ear Problem     Left ear pain-patient on doxycycline for ear infection      HPI    Persistent nasal and sinus congestion and pressure when exposed to home where he states there are mold problems.  Reports on leaving the house symptoms significantly improved.  Recently completed a course of doxycycline for probable sinus infection.  Notes that bilateral ears are intermittently plugged and painful, worse with laying on affected side and rotating.  Denies any hearing loss.  Denies any fever/chills, shortness of breath, cough, hemoptysis, chest pain, lightheadedness, headache, skin changes or rash, or other  complaints.      Review of Systems  Focused review of systems completed, pertinent positives negatives in the HPI      Objective    /72   Pulse 91   Temp 97.9  F (36.6  C) (Tympanic)   Resp 12   SpO2 98%   Physical Exam   GENERAL: healthy, alert and no distress  HENT: ear canals and TM's normal, nose and mouth without ulcers or lesions  NECK: no adenopathy, no asymmetry, masses, or scars and thyroid normal to palpation  RESP: lungs clear to auscultation - no rales, rhonchi or wheezes  CV: regular rates and rhythm, normal S1 S2, no S3 or S4, no murmur, click or rub, peripheral pulses strong and no peripheral edema  SKIN: no suspicious lesions or rashes

## 2021-12-08 NOTE — PATIENT INSTRUCTIONS
Use medications as directed.  Follow-up with primary care provider next week to discuss ongoing allergic rhinitis and persistent symptoms if no resolution over the next week.  Return for worsening symptoms including fever, shortness of breath, cough, or other concerning symptoms.

## 2021-12-08 NOTE — LETTER
December 8, 2021      Todd Bowden  [address] [address]        To Whom It May Concern:    Todd Bowden  was seen on 12/08/21.  Please excuse him  until tomorrow due to illness.        Sincerely,        Galen Land PA-C

## 2022-01-10 ENCOUNTER — OFFICE VISIT (OUTPATIENT)
Dept: FAMILY MEDICINE | Facility: CLINIC | Age: 52
End: 2022-01-10
Payer: COMMERCIAL

## 2022-01-10 VITALS
DIASTOLIC BLOOD PRESSURE: 83 MMHG | HEART RATE: 78 BPM | WEIGHT: 165.8 LBS | TEMPERATURE: 98.9 F | SYSTOLIC BLOOD PRESSURE: 143 MMHG | BODY MASS INDEX: 23.79 KG/M2 | OXYGEN SATURATION: 100 %

## 2022-01-10 DIAGNOSIS — J01.01 ACUTE RECURRENT MAXILLARY SINUSITIS: Primary | ICD-10-CM

## 2022-01-10 PROCEDURE — 99213 OFFICE O/P EST LOW 20 MIN: CPT

## 2022-01-10 RX ORDER — IBUPROFEN 600 MG/1
600 TABLET, FILM COATED ORAL EVERY 6 HOURS PRN
Qty: 30 TABLET | Refills: 0 | Status: SHIPPED | OUTPATIENT
Start: 2022-01-10

## 2022-01-10 RX ORDER — FLUTICASONE PROPIONATE 50 MCG
1 SPRAY, SUSPENSION (ML) NASAL DAILY
Qty: 16 G | Refills: 0 | Status: SHIPPED | OUTPATIENT
Start: 2022-01-10 | End: 2022-09-12

## 2022-01-10 ASSESSMENT — ENCOUNTER SYMPTOMS
FATIGUE: 1
FEVER: 1
SORE THROAT: 1
RHINORRHEA: 1
DIAPHORESIS: 1
COUGH: 1
CHILLS: 1
CARDIOVASCULAR NEGATIVE: 1
SINUS PAIN: 1
SINUS PRESSURE: 1
SHORTNESS OF BREATH: 0

## 2022-01-10 NOTE — LETTER
January 10, 2022      Todd Bowden  [address] [address]        To Whom It May Concern,      Due to medical concerns, Todd's apartment is to be checked for black mold or other causes within his location.         Sincerely,      MARZENA Duckworth [facility]

## 2022-01-11 NOTE — PROGRESS NOTES
Assessment & Plan     Acute recurrent maxillary sinusitis  =  - Adult Allergy/Asthma Referral  - fluticasone (FLONASE) 50 MCG/ACT nasal spray  Dispense: 16 g; Refill: 0  - ibuprofen (ADVIL/MOTRIN) 600 MG tablet  Dispense: 30 tablet; Refill: 0  - amoxicillin-clavulanate (AUGMENTIN) 875-125 MG tablet  Dispense: 20 tablet; Refill: 0     1.  Plenty of fluids, rest, warm compresses on face  2.  Mucinex twice daily for at least 4 days  3.  Pastora Pot 1x in the morning 1x at night (SALINE MIST SPRAY IS AN ACCEPTABLE, THOUGH NOT AS EFFECTIVE REPLACEMENT)  4.  Benadryl (diphenhydramine) at bedtime   5.  Either Claritin (Loratadine), Allegra (Fexofenadine), or Zyrtec (Cetirizine) in the day  6.  Flonase (Fluticasone) 2x each nostril twice a day for two weeks, then once each nostril once a day  7. Antibiotic twice daily for 7 Days  8. Probiotic (ie Culturelle) 1-2 hours after each antibiotic dose         Return in about 1 week (around 1/17/2022), or if symptoms worsen or fail to improve.      Subjective     Todd is a 51 year old male who presents to clinic today for the following health issues:  Chief Complaint   Patient presents with     URI     Sinus congestion     Todd presents with nasal congestion x several weeks. He reports he has had to come back and forth to clinics. He was diagnosed with COVID, has completed quarantine, but was sick before and now after. There is concern for black mold in his apartment. He reports head ache, fatigue, facial pain that is day and worse with sitting forward, fever, body aches. He denies nausea vomiting. He is not vaccinated for COVID. He has not tried anything at home.           Review of Systems   Constitutional: Positive for chills, diaphoresis, fatigue and fever.   HENT: Positive for ear pain, rhinorrhea, sinus pressure, sinus pain and sore throat.    Respiratory: Positive for cough. Negative for shortness of breath.    Cardiovascular: Negative.            Objective    BP (!) 143/83    Pulse 78   Temp 98.9  F (37.2  C)   Wt 75.2 kg (165 lb 12.8 oz)   SpO2 100%   BMI 23.79 kg/m    Physical Exam  Constitutional:       Appearance: Normal appearance.   HENT:      Head: Normocephalic and atraumatic.      Right Ear: Tympanic membrane, ear canal and external ear normal.      Left Ear: Tympanic membrane, ear canal and external ear normal.      Nose: Congestion present.      Right Sinus: Maxillary sinus tenderness present.      Left Sinus: Maxillary sinus tenderness present.      Mouth/Throat:      Pharynx: Posterior oropharyngeal erythema present.   Eyes:      Extraocular Movements: Extraocular movements intact.      Conjunctiva/sclera: Conjunctivae normal.      Pupils: Pupils are equal, round, and reactive to light.   Cardiovascular:      Rate and Rhythm: Normal rate and regular rhythm.      Heart sounds: Normal heart sounds.   Pulmonary:      Effort: Pulmonary effort is normal.      Breath sounds: Normal breath sounds.   Musculoskeletal:      Cervical back: Normal range of motion and neck supple.   Neurological:      Mental Status: He is alert.              Burke Chung PA-C

## 2022-01-11 NOTE — PATIENT INSTRUCTIONS
1.  Plenty of fluids, rest, warm compresses on face  2.  Mucinex twice daily for at least 4 days  3.  Pastora Pot 1x in the morning 1x at night (SALINE MIST SPRAY IS AN ACCEPTABLE, THOUGH NOT AS EFFECTIVE REPLACEMENT)  4.  Benadryl (diphenhydramine) at bedtime   5.  Either Claritin (Loratadine), Allegra (Fexofenadine), or Zyrtec (Cetirizine) in the day  6.  Flonase (Fluticasone) 2x each nostril twice a day for two weeks, then once each nostril once a day  7. Antibiotic twice daily for 10 Days  8. Probiotic (ie Culturelle) 1-2 hours after each antibiotic dose     Please let us know if symptoms persist, or worsen.      Patient Education     Self-Care for Sinusitis     Drinking plenty of water can help sinuses drain.   Sinusitis can often be managed with self-care. Self-care can keep sinuses moist and make you feel more comfortable. Remember to follow your doctor's instructions closely. This can make a big difference in getting your sinus problem under control.  Drink fluids  Drinking extra fluids helps thin your mucus. This lets it drain from your sinuses more easily. Have a glass of water every hour or two. A humidifier helps in much the same way. Fluids can also offset the drying effects of certain medicines. If you use a humidifier, follow the product maker's instructions on how to use it. Clean it on a regular schedule.  Use saltwater rinses  Rinses help keep your sinuses and nose moist. Mix a teaspoon of salt in 8 ounces of fresh, warm water. Use a bulb syringe to gently squirt the water into your nose a few times a day. You can also buy ready-made saline nasal sprays.  Apply hot or cold packs  Applying heat to the area surrounding your sinuses may make you feel more comfortable. Use a hot water bottle or a hand towel dipped in hot water. Some people also find ice packs effective for relieving pain.  Medicines  Your doctor may prescribe medications to help treat your sinusitis. If you have an infection,  antibiotics can help clear it up. If you are prescribed antibiotics, take all pills on schedule until they are gone, even if you feel better. Decongestants help relieve swelling. Use decongestant sprays for short periods only under the direction of your doctor. If you have allergies, your doctor may prescribe medications to help relieve them.   Date Last Reviewed: 10/1/2016    9195-2430 [employer]. [address] [place] [address], [place] [address]. All rights reserved. This information is not intended as a substitute for professional medical care. Always follow your healthcare professional's instructions.

## 2022-01-20 ENCOUNTER — VIRTUAL VISIT (OUTPATIENT)
Dept: URGENT CARE | Facility: CLINIC | Age: 52
End: 2022-01-20
Payer: COMMERCIAL

## 2022-01-20 ENCOUNTER — TELEPHONE (OUTPATIENT)
Dept: URGENT CARE | Facility: CLINIC | Age: 52
End: 2022-01-20

## 2022-01-20 DIAGNOSIS — J01.91 ACUTE RECURRENT SINUSITIS, UNSPECIFIED LOCATION: Primary | ICD-10-CM

## 2022-01-20 PROCEDURE — 99213 OFFICE O/P EST LOW 20 MIN: CPT | Mod: 95

## 2022-01-20 RX ORDER — DOXYCYCLINE HYCLATE 100 MG
100 TABLET ORAL 2 TIMES DAILY
Qty: 14 TABLET | Refills: 0 | Status: SHIPPED | OUTPATIENT
Start: 2022-01-20 | End: 2022-01-27

## 2022-01-21 NOTE — PATIENT INSTRUCTIONS
Patient Education     Understanding Sinus Problems     You don t often think about your sinuses until there s a problem. One day you realize you can t smell dinner cooking. Or you find you often have headaches or problems breathing through your nose.  Symptoms of sinus problems  Sinus problems can cause uncomfortable symptoms. Your nose may run nonstop. You might have trouble sleeping at night. You may even lose your sense of smell. Other symptoms are:    Nasal congestion    Fullness in ears    Green, yellow, or bloody drainage from the nose    Drainage in your throat    Bad breath    Trouble tasting food    Frequent headaches    Face pain    Cough  When sinuses are blocked  If something blocks the passages in the nose or sinuses, mucus can t drain. The sinuses may then become infected.    Colds cause the lining of the nose and sinuses to swell and make extra mucus. A buildup of mucus can lead to a more serious infection.    Allergies irritate turbinates and other tissues. This causes swelling, which can cause a blockage. Over time, this irritation can also lead to sacs of swollen tissue (polyps).    Polyps may form in both the sinuses and nose. Polyps can grow large enough to clog nasal passages and block drainage.    A crooked (deviated) septum may block nasal passages. This is often the result of an injury.  [employer] last reviewed this educational content on 1/1/2020 2000-2021 [employer]. All rights reserved. This information is not intended as a substitute for professional medical care. Always follow your healthcare professional's instructions.

## 2022-01-21 NOTE — TELEPHONE ENCOUNTER
Note for work was place in chart.       Pt had a virtual visit tonight, dx with Acute recurrent sinusitis, prescribed Doxy.  Pt has chills and night sweats.      Pt questions if he needs to continue isolating, states he has been sick for weeks and his work wants him to go back in-person if allowed.  He had COVID in December.     If pt needs to continue isolating, he is requesting a letter stating this for work.      [facility]  Pt is requesting this tonight.      Gardenia Cannon RN  [facility] Nurse Advisor

## 2022-01-21 NOTE — TELEPHONE ENCOUNTER
FUTURE VISIT INFORMATION      FUTURE VISIT INFORMATION:    Date: 2/14/22    Time: 3PM    Location: [facility]  REFERRAL INFORMATION:    Referring provider:  Marj Tapia CNP    Referring providers clinic:  [facility]    Reason for visit/diagnosis  Per Pt, dx Acute recurrent sinusitis, unspecified location [J01.91] referral from Marj Tapia CNP    RECORDS REQUESTED FROM:       Clinic name Comments Records Status Imaging Status   [facility] 1/20/22 note and referral from Marj Tapia CNP Epic    Imaging 4/17/13 MR brain   4/14/13 XT Head  Epic PACS   [facility] 1/10/22 note form Burke Chung, PA-C   [facility] [place]  12/8/21 note from Galen GEE [facility] 5/18/21 note from Grzegorz Norman MD  Care everywhere     [facility]  4/20/21 note from Omar Valenzuela MD   Care everywhere    [facility] imaging  5/27/21 CT Head Sinus  5/13/19 CT Head Brain  Care everywhere req 1/21/22 - PACS           1/21/22 11AM sent af ax to [facility] for images - [facility]   1/26/22 2:14PM images received in PACS - [facility]

## 2022-01-21 NOTE — TELEPHONE ENCOUNTER
RN called pt and gave him the information.  Patient verbalized understanding and had no further questions.      Gardenia Cannon RN  [facility] Nurse Advisor

## 2022-01-21 NOTE — PROGRESS NOTES
Todd is a 51 year old who is being evaluated via a billable telephone visit.        Assessment & Plan     Acute recurrent sinusitis, unspecified location  Continue with Flonase and Sinus rinses.   - doxycycline hyclate (VIBRA-TABS) 100 MG tablet; Take 1 tablet (100 mg) by mouth 2 times daily for 7 days  - Otolaryngology Referral; Future    20 minutes spent on the date of the encounter doing chart review, patient visit and documentation        Tobacco Cessation:   reports that he has been smoking cigarettes. He has a 10.00 pack-year smoking history. He has never used smokeless tobacco.      See Patient Instructions    No follow-ups on file.    Virtual Urgent Care  [facility]    Subjective   Todd is a 51 year old who presents for the following health issues     HPI     52 yo male presents to [facility] for ongoing sinus issues. Was treated for sinus infection early December with doxy then again in early January with Augmentin. Feels the Augmentin did not work and still has pain and pressure on the left side of his face.   Has seen ENT in the past with Matthew.   Smokes cigarettes.     Review of Systems   Constitutional, HEENT, cardiovascular, pulmonary, gi and gu systems are negative, except as otherwise noted.      Objective           Vitals:  No vitals were obtained today due to virtual visit.    Physical Exam   healthy, alert and no distress  PSYCH: Alert and oriented times 3; coherent speech, normal   rate and volume, able to articulate logical thoughts, able   to abstract reason, no tangential thoughts, no hallucinations   or delusions  His affect is normal  RESP: No cough, no audible wheezing, able to talk in full sentences  Remainder of exam unable to be completed due to telephone visits            Phone call duration: 10 minutes

## 2022-02-06 ENCOUNTER — OFFICE VISIT (OUTPATIENT)
Dept: FAMILY MEDICINE | Facility: CLINIC | Age: 52
End: 2022-02-06
Payer: COMMERCIAL

## 2022-02-06 VITALS
WEIGHT: 170.4 LBS | TEMPERATURE: 97.8 F | DIASTOLIC BLOOD PRESSURE: 74 MMHG | BODY MASS INDEX: 24.45 KG/M2 | SYSTOLIC BLOOD PRESSURE: 118 MMHG | OXYGEN SATURATION: 98 % | HEART RATE: 86 BPM

## 2022-02-06 DIAGNOSIS — J01.90 ACUTE SINUSITIS WITH SYMPTOMS > 10 DAYS: Primary | ICD-10-CM

## 2022-02-06 PROCEDURE — 99213 OFFICE O/P EST LOW 20 MIN: CPT

## 2022-02-06 RX ORDER — CEFDINIR 300 MG/1
300 CAPSULE ORAL 2 TIMES DAILY
Qty: 28 CAPSULE | Refills: 0 | Status: SHIPPED | OUTPATIENT
Start: 2022-02-06 | End: 2022-02-20

## 2022-02-07 NOTE — PROGRESS NOTES
ICD-10-CM    1. Acute sinusitis with symptoms > 10 days  J01.90 cefdinir (OMNICEF) 300 MG capsule     Purulent discharge noted in R naris so will try another round of antibiotics for continued sinusitis.  However, if no improvement after this round of antibiotics, would consider sinus imaging or other further investigation.    PLAN:  Pt wanting a note to exempt him from wearing the required N95 mask at work.  Discussed with patient that we are not providing any mask exemptions through urgent care and that there's no guarantee that his primary care provider will be willing to write such a letter either.    Patient Instructions   Omnicef twice a day for 14 days total.    Follow up with primary doctor to discuss exemption to mask requirement.        Has ENT follow up visit on 2/14/22.      SUBJECTIVE:  Todd Bowden is a 51 year old male who presents to  today with continuing sinus pain and pressure and now ear pain.  Has been dealing with sinus pain and pressure for over 6 weeks now.  Ear pain is new, L>R.  No ear drainage.  R ear will pop sometimes, but L ear won't pop.  Has been on Augmentin and doxycycline for this.  Augmentin did not help at all.  Doxycycline helped a little.  Has been using nasal steroid spray as well.  Had also tried Afrin.    Tested positive for COVID on Wednesday last week.      Patient is required to wear an N95 mask at work and he finds this uncomfortable because of his nasal congestion.  He thinks it is also making his ear pain worse.    OBJECTIVE:  /74   Pulse 86   Temp 97.8  F (36.6  C) (Tympanic)   Wt 77.3 kg (170 lb 6.4 oz)   SpO2 98%   BMI 24.45 kg/m    GEN: well-appearing, in NAD  ENT: TMs normal, canals normal, R naris with crusting and purulent discharge, L naris clear, oral MMM, normal pharynx  Neck: no LAD noted

## 2022-02-07 NOTE — PATIENT INSTRUCTIONS
Omnicef twice a day for 14 days total.    Follow up with primary doctor to discuss exemption to mask requirement.

## 2022-02-14 ENCOUNTER — OFFICE VISIT (OUTPATIENT)
Dept: OTOLARYNGOLOGY | Facility: CLINIC | Age: 52
End: 2022-02-14
Attending: NURSE PRACTITIONER
Payer: COMMERCIAL

## 2022-02-14 ENCOUNTER — PRE VISIT (OUTPATIENT)
Dept: OTOLARYNGOLOGY | Facility: CLINIC | Age: 52
End: 2022-02-14

## 2022-02-14 VITALS
HEIGHT: 70 IN | DIASTOLIC BLOOD PRESSURE: 82 MMHG | WEIGHT: 167.1 LBS | BODY MASS INDEX: 23.92 KG/M2 | OXYGEN SATURATION: 99 % | TEMPERATURE: 98 F | SYSTOLIC BLOOD PRESSURE: 130 MMHG | HEART RATE: 96 BPM

## 2022-02-14 DIAGNOSIS — J01.91 ACUTE RECURRENT SINUSITIS, UNSPECIFIED LOCATION: Primary | ICD-10-CM

## 2022-02-14 PROCEDURE — 99000 SPECIMEN HANDLING OFFICE-LAB: CPT | Performed by: PATHOLOGY

## 2022-02-14 PROCEDURE — 87070 CULTURE OTHR SPECIMN AEROBIC: CPT | Mod: 90 | Performed by: PATHOLOGY

## 2022-02-14 PROCEDURE — 99204 OFFICE O/P NEW MOD 45 MIN: CPT | Performed by: OTOLARYNGOLOGY

## 2022-02-14 RX ORDER — MUPIROCIN 20 MG/G
OINTMENT TOPICAL
Qty: 30 G | Refills: 1 | Status: SHIPPED | OUTPATIENT
Start: 2022-02-14 | End: 2022-02-24

## 2022-02-14 ASSESSMENT — MIFFLIN-ST. JEOR: SCORE: 1619.21

## 2022-02-14 ASSESSMENT — PAIN SCALES - GENERAL: PAINLEVEL: EXTREME PAIN (8)

## 2022-02-14 NOTE — PATIENT INSTRUCTIONS
1. Please follow-up in clinic in 4-6 weeks   2. Please call the ENT clinic with any questions,concerns, new or worsening symptoms.    -Clinic number is [phone]   - Leela's direct line (Dr. Flores's nurse) [phone]

## 2022-02-14 NOTE — NURSING NOTE
"Chief Complaint   Patient presents with     Consult     acute recurrent sinusitis     Blood pressure 130/82, pulse 96, temperature 98  F (36.7  C), temperature source Temporal, height 1.778 m (5' 10\"), weight 75.8 kg (167 lb 1.6 oz), SpO2 99 %. Gavi Huynh, EMT  "

## 2022-02-14 NOTE — PROGRESS NOTES
HISTORY OF PRESENT ILLNESS:  Todd is here to see us today for the first time.  He is a 51-year-old gentleman who comes in today with complaints of nasal congestion, increased nasal drainage.  He has a history of having seen Otolaryngology in the past in April of 2021, and had a CT scan, which was fairly unremarkable for similar-type symptoms.  The patient feels that over the last couple of months, he has had night sweats and an unintentional weight loss of 13 pounds.  He also feels significantly discouraged about his health and in some ways hopeless.  He has tried fluticasone nasal spray as well as azelastine without substantial improvement in his symptoms.  He does have a history of smoking cigarettes.  He has been on antibiotics without substantial improvement in his symptoms.  Overall, he just feels under the whether.    PAST MEDICAL HISTORY: Noted and reviewed in the chart.     FAMILY HISTORY: Noted and reviewed in the chart.     PAST SURGICAL HISTORY: Noted and reviewed in the chart.     SOCIAL HISTORY: Noted and reviewed in the chart.     REVIEW OF SYSTEMS:  Pertinent positives and negatives as above.  Otherwise, complete review of systems is noted and reviewed in the chart.    PHYSICAL EXAMINATION:  This is a 51-year-old gentleman in no acute distress.  Normal mood and affect, normal ability to communicate.  Alert and appropriate.  Breathing without difficulty or stridor.  Eyes are anicteric.  Skin of the head and neck appears normal.  Examination of the nose shows thick, viscous yellow mucus bilaterally, which is cleaned and is quite tenacious.  A culture was taken and there is no clementina purulence.  No polyps.  Oral cavity shows normal tongue, buccal mucosa and oropharynx.  Palpation of the neck shows no masses, tenderness or lymphadenopathy.  Neck is supple.  Musculature of the neck is within normal limits.  There is no thyromegaly appreciated.  Examination of the ears show normal external auditory canals  and tympanic membranes.    ASSESSMENT:  A 51-year-old with nasal congestion, crusting and thick debris.    PLAN:  At this point, we will do a culture from the right side of the nose, start him on Bactroban for his nasal irrigations which he says he does with regularity.  I did speak with his primary care provider regarding his situation.  He will be seeing her in the next couple of days.  I will see him back in six weeks.    A total of 45 minutes is spent with the patient and on chart review and documentation on the date of the visit.

## 2022-02-14 NOTE — LETTER
2/14/2022       RE: Todd Bowden  [address]  [facility] [address]     Dear Colleague,    Thank you for referring your patient, Todd Bowden, to the [facility] [place] [facility]. Please see a copy of my visit note below.    HISTORY OF PRESENT ILLNESS:  Todd is here to see us today for the first time.  He is a 51-year-old gentleman who comes in today with complaints of nasal congestion, increased nasal drainage.  He has a history of having seen Otolaryngology in the past in April of 2021, and had a CT scan, which was fairly unremarkable for similar-type symptoms.  The patient feels that over the last couple of months, he has had night sweats and an unintentional weight loss of 13 pounds.  He also feels significantly discouraged about his health and in some ways hopeless.  He has tried fluticasone nasal spray as well as azelastine without substantial improvement in his symptoms.  He does have a history of smoking cigarettes.  He has been on antibiotics without substantial improvement in his symptoms.  Overall, he just feels under the whether.    PAST MEDICAL HISTORY: Noted and reviewed in the chart.     FAMILY HISTORY: Noted and reviewed in the chart.     PAST SURGICAL HISTORY: Noted and reviewed in the chart.     SOCIAL HISTORY: Noted and reviewed in the chart.     REVIEW OF SYSTEMS:  Pertinent positives and negatives as above.  Otherwise, complete review of systems is noted and reviewed in the chart.    PHYSICAL EXAMINATION:  This is a 51-year-old gentleman in no acute distress.  Normal mood and affect, normal ability to communicate.  Alert and appropriate.  Breathing without difficulty or stridor.  Eyes are anicteric.  Skin of the head and neck appears normal.  Examination of the nose shows thick, viscous yellow mucus bilaterally, which is cleaned and is quite tenacious.  A culture was taken and there is no  clementina purulence.  No polyps.  Oral cavity shows normal tongue, buccal mucosa and oropharynx.  Palpation of the neck shows no masses, tenderness or lymphadenopathy.  Neck is supple.  Musculature of the neck is within normal limits.  There is no thyromegaly appreciated.  Examination of the ears show normal external auditory canals and tympanic membranes.    ASSESSMENT:  A 51-year-old with nasal congestion, crusting and thick debris.    PLAN:  At this point, we will do a culture from the right side of the nose, start him on Bactroban for his nasal irrigations which he says he does with regularity.  I did speak with his primary care provider regarding his situation.  He will be seeing her in the next couple of days.  I will see him back in six weeks.    A total of 45 minutes is spent with the patient and on chart review and documentation on the date of the visit.      Karl Flores MD

## 2022-02-16 LAB — BACTERIA SPEC CULT: NO GROWTH

## 2022-02-21 ENCOUNTER — TELEPHONE (OUTPATIENT)
Dept: BEHAVIORAL HEALTH | Facility: CLINIC | Age: 52
End: 2022-02-21

## 2022-02-23 ENCOUNTER — HOSPITAL ENCOUNTER (OUTPATIENT)
Dept: BEHAVIORAL HEALTH | Facility: CLINIC | Age: 52
Discharge: HOME | End: 2022-02-23
Attending: OTOLARYNGOLOGY | Admitting: OTOLARYNGOLOGY
Payer: COMMERCIAL

## 2022-02-23 DIAGNOSIS — J01.91 ACUTE RECURRENT SINUSITIS, UNSPECIFIED LOCATION: ICD-10-CM

## 2022-02-23 PROCEDURE — 90791 PSYCH DIAGNOSTIC EVALUATION: CPT | Mod: 95 | Performed by: COUNSELOR

## 2022-02-23 ASSESSMENT — ANXIETY QUESTIONNAIRES
7. FEELING AFRAID AS IF SOMETHING AWFUL MIGHT HAPPEN: MORE THAN HALF THE DAYS
GAD7 TOTAL SCORE: 18
2. NOT BEING ABLE TO STOP OR CONTROL WORRYING: NEARLY EVERY DAY
3. WORRYING TOO MUCH ABOUT DIFFERENT THINGS: NEARLY EVERY DAY
6. BECOMING EASILY ANNOYED OR IRRITABLE: MORE THAN HALF THE DAYS
1. FEELING NERVOUS, ANXIOUS, OR ON EDGE: NEARLY EVERY DAY
5. BEING SO RESTLESS THAT IT IS HARD TO SIT STILL: MORE THAN HALF THE DAYS
4. TROUBLE RELAXING: NEARLY EVERY DAY

## 2022-02-23 ASSESSMENT — COLUMBIA-SUICIDE SEVERITY RATING SCALE - C-SSRS
5. HAVE YOU STARTED TO WORK OUT OR WORKED OUT THE DETAILS OF HOW TO KILL YOURSELF? DO YOU INTEND TO CARRY OUT THIS PLAN?: NO
4. HAVE YOU HAD THESE THOUGHTS AND HAD SOME INTENTION OF ACTING ON THEM?: NO
1. IN THE PAST MONTH, HAVE YOU WISHED YOU WERE DEAD OR WISHED YOU COULD GO TO SLEEP AND NOT WAKE UP?: YES
2. HAVE YOU ACTUALLY HAD ANY THOUGHTS OF KILLING YOURSELF IN THE PAST MONTH?: NO
6. HAVE YOU EVER DONE ANYTHING, STARTED TO DO ANYTHING, OR PREPARED TO DO ANYTHING TO END YOUR LIFE?: NO
3. HAVE YOU BEEN THINKING ABOUT HOW YOU MIGHT KILL YOURSELF?: NO

## 2022-02-23 ASSESSMENT — PATIENT HEALTH QUESTIONNAIRE - PHQ9: SUM OF ALL RESPONSES TO PHQ QUESTIONS 1-9: 19

## 2022-02-23 NOTE — PATIENT INSTRUCTIONS
Date: 02/28/2022  Time: 9:00am  Provider: Mariana Cortés MS  Address: [facility]  [facility] [address] Floor - [address]  [address] [place] [address]  Phone: [phone]    Outpatient Mental Health Services - Adult    MY COPING PLAN FOR SAFETY        Name: Todd Bowden  YOB: 1970  Date: February 23, 2022   My primary care provider:  Dr. Gann with [facility]  My primary care clinic:     My prescriber:     Other care team support:       My Triggers:  Work  difficulties and Medical Health   Additional People, Places, and Things that I can access for support: Children         What is important to me and makes life worth living: My children.        GREEN    Good Control  1. I feel good  2. No suicidal thoughts   3. Can work, sleep and play      Action Steps  1. Self-care: balanced meals, exercising, sleep practices, etc.  2. Take your medications as prescribed.  3. Continue meetings with therapist and prescriber.  4.  Do the healthy things that I enjoy.            YELLOW  Getting Worse  I have ANY of these:  1. I do not feel good  2. Difficulty Concentrating  3. Sleep is changing  4. Increase/Change in my thoughts to hurt self and/or others, but I can still manage and not act on it.   5. Not taking care of self.               Action Steps (in addition to the above):  1. Inform your therapist and psychiatric prescriber/PCP.  2. Keep taking your medications as prescribed.    3. Turn to people you can ask for help.  4. Use internal coping strategies -see below.  5. Create safe environment: notify friends/family of increase in symptoms and reduce means to other identified method            RED  Get Help  If I have ANY of these:  1. Current and uncontrollable thoughts and/or behaviors to hurt self and/or others.      Actions to manage my safety  1. Contact your emergency person Michelle Lanza (mother) [phone]  2. Call my crisis team-  [facility] [phone] [facility]  3. Or Call 911 or go to the emergency room right away          My Internal Coping Strategies include the following:  take a bath, arts and crafts, exercise and use my coping skills    (Safety Plan provided via email)     Safety Concerns  How To Identify Situations That Make Your Mental Health Worse:  Triggers are things that make your mental health worse.  Look at the list below to help you find your triggers and what you can do about them.     1. Identify Early Warning Signs:    Sometimes symptoms return, even when people do their best to stay well. Symptoms can develop over a short period of time with little or no warning, but most of the time they emerge gradually over several weeks.  Early warning signs are changes that people experience when a relapse is starting. Some early warning signs are common and others are not as common.   Common Early Warning Signs:    Feeling tense or nervous, Eating less or eating more, Trouble sleeping -either too much or too little sleep, Feeling depressed or low, Feeling irritable, Feeling like not being around other people, Trouble concentrating and Urges to use drugs or alcohol           2. Identify action steps to take when warning signs are noticed:    Taking Action- It is important to take action if you are experiencing early warning signs of a relapse.  The faster you act, the more likely it is that you can avoid a full relapse.  It is helpful to identify several specific ways to cope with symptoms.      The following is my list of symptoms and coping strategies that I can use when they are present:    Symptom Coping Strategies   Anxiety -Talk with someone in your support system and let him or her know how you are feeling.  -Use relaxation techniques such as deep breathing or imagery.  -Use positive affirmations to counteract negative self-talk such as  I am learning to let go of worry.    Depression -  Schedule your day; include activities you have to do and activities you enjoy doing.  - Get some exercise - walk, run, bike, or swim.  - Give yourself credit for even the smallest things you get done.   Sleep Difficulties   - Go to sleep at the same time every day.  - Do something relaxing before bed, such as drinking herbal tea or listening to music.  - Avoid having discussions about upsetting topics before going to bed.   Delusions   - Distract yourself from the disturbing thought by doing something that requires your attention such as a puzzle.  - Check out your beliefs by talking to someone you trust.    Hallucinations   - Use headphones to listen to music.  - Tell voices to  stop  or say to yourself,  I am safe.   - Ignore the hallucinations as much as possible; focus on other things.   Concentration Difficulties - Minimize distractions so there is only one thing for you to focus on at a time.    - Ask the person you are having a conversation with to slow down or repeat things you are unsure of.

## 2022-02-23 NOTE — PROGRESS NOTES
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 doing something that requires your attention such as a puzzle.  - Check out your beliefs by talking to someone you trust.    Hallucinations   - Use headphones to listen to music.  - Tell voices to  stop  or say to yourself,  I am safe.   - Ignore the hallucinations as much as possible; focus on other things.   Concentration Difficulties - Minimize distractions so there is only one thing for you to focus on at a time.    - Ask the person you are having a conversation with to slow down or repeat things you are unsure of.

## 2022-02-24 ASSESSMENT — ANXIETY QUESTIONNAIRES: GAD7 TOTAL SCORE: 18

## 2022-02-28 ENCOUNTER — MEDICAL CORRESPONDENCE (OUTPATIENT)
Dept: HEALTH INFORMATION MANAGEMENT | Facility: CLINIC | Age: 52
End: 2022-02-28

## 2022-03-02 ENCOUNTER — TRANSCRIBE ORDERS (OUTPATIENT)
Dept: MULTI SPECIALTY CLINIC | Facility: CLINIC | Age: 52
End: 2022-03-02

## 2022-03-02 DIAGNOSIS — R76.8 RHEUMATOID FACTOR POSITIVE: ICD-10-CM

## 2022-03-02 DIAGNOSIS — M02.30 REITER'S DISEASE (H): Primary | ICD-10-CM

## 2022-03-08 ENCOUNTER — OFFICE VISIT (OUTPATIENT)
Dept: ALLERGY | Facility: CLINIC | Age: 52
End: 2022-03-08
Payer: COMMERCIAL

## 2022-03-08 VITALS — DIASTOLIC BLOOD PRESSURE: 76 MMHG | SYSTOLIC BLOOD PRESSURE: 119 MMHG | OXYGEN SATURATION: 96 % | HEART RATE: 83 BPM

## 2022-03-08 DIAGNOSIS — J31.0 NONALLERGIC RHINITIS: Primary | ICD-10-CM

## 2022-03-08 PROCEDURE — 95004 PERQ TESTS W/ALRGNC XTRCS: CPT | Performed by: ALLERGY & IMMUNOLOGY

## 2022-03-08 PROCEDURE — 99243 OFF/OP CNSLTJ NEW/EST LOW 30: CPT | Mod: 25 | Performed by: ALLERGY & IMMUNOLOGY

## 2022-03-08 RX ORDER — PREDNISONE 10 MG/1
TABLET ORAL
COMMUNITY
Start: 2022-02-28 | End: 2024-09-17

## 2022-03-08 RX ORDER — AZELASTINE 1 MG/ML
1 SPRAY, METERED NASAL
COMMUNITY
Start: 2021-04-20 | End: 2022-03-08

## 2022-03-08 RX ORDER — TRAZODONE HYDROCHLORIDE 50 MG/1
50-100 TABLET, FILM COATED ORAL
COMMUNITY
Start: 2022-02-28

## 2022-03-08 RX ORDER — AZELASTINE 1 MG/ML
1 SPRAY, METERED NASAL 2 TIMES DAILY
Qty: 30 ML | Refills: 1 | Status: SHIPPED | OUTPATIENT
Start: 2022-03-08

## 2022-03-08 NOTE — PROGRESS NOTES
Per provider verbal order, placed Adult Environmental Panel scratch test.  Consent was obtained prior to procedure.  Once panels were placed, patient was monitored for 15 minutes in clinic.  Provider read test after 15 minutes..  Pt tolerated procedure well.  All questions and concerns were addressed at office visit.       Maribel Rhodes, KRISTIEN, RN

## 2022-03-08 NOTE — PATIENT INSTRUCTIONS
If you have any questions regarding your allergies, asthma, or what we discussed during your visit today please call the allergy clinic or contact us via [employer].    [facility] Allergy RN Line: [phone] - call this number with any questions during or after business/clinic hours  [facility] Scheduling Line: [phone]  [facility] Scheduling Line: [phone] - this number is ONLY for scheduling at the [facility] and should not be used to get in touch with the allergy team    All visits for food challenges, medication/drug allergy testing, and drug challenges MUST be scheduled through the allergy clinic nurse. Please call the nurse at [phone] or send a [employer] message for scheduling. Appointments for these visits that are made through the schedulers or via [employer] may be cancelled or rescheduled.    Clinic Schedule:   [facility] - Monday, Tuesday, and Thursday  [address] [place] [address]    [facility] - Wednesday  [address] [place] [address]      Your allergy testing is negative    Continue to follow-up with Dr. Flores and your primary care provider for the nasal congestion    ENVIRONMENTAL PERCUTANEOUS SKIN TESTING: ADULT  [facility] 3/8/2022   Consent Y   Ordering Physician Dr. Shen   Interpreting Physician Dr. Shen   Testing Technician Maribel DIAS RN   Location Back   Time start:  3:55 PM   Time End:  4:05 PM   Positive Control: Histatrol*ALK 1 mg/ml 5/22   Negative Control: 50% Glycerin 0   Cat Hair*ALK (10,000 BAU/ml) 0   AP Dog Hair/Dander (1:100 w/v) 0   Dust Mite p. 30,000 AU/ml 0   Dust Mite f. (30,000 AU/ml) 0   Jacinto (W/F in millimeters) 0   Parvez Grass (100,000 BAU/mL) 0   Red Cedar (W/F in millimeters) 0   Maple/Hill (W/F in millimeters) 0   Hackberry (W/F in millimeters) 0   [place] (W/F in millimeters) 0   [place] *ALK (W/F in millimeters) 0   [facility]  (W/F in millimeters) 0   [place] (W/F in millimeters) 0   Black [place] (W/F in millimeters) 0   Birch Mix (W/F in millimeters) 0   [place] (W/F in millimeters) 0   Oak (W/F in millimeters) 0   Cocklebur (W/F in millimeters) 0   [place] (W/F in millimeters) 0   White Darren (W/F in millimeters) 0   Careless (W/F in millimeters) 0   Nettle (W/F in millimeters) 0   English Plantain (W/F in millimeters) 0   Kochia (W/F in millimeters) 0   Lamb's Quarter (W/F in millimeters) 0   Marshelder (W/F in millimeters) 0   Ragweed Mix* ALK (W/F in millimeters) 0   Russian Thistle (W/F in millimeters) 0   Sagebrush/Mugwort (W/F in millimeters) 0   Sheep Sorrel (W/F in millimeters) 0   Feather Mix* ALK (W/F in millimeters) 0   Penicillium Mix (1:10 w/v) 0   Curvularia spicifera (1:10 w/v) 0   Epicoccum (1:10 w/v) 0   Aspergillus fumigatus (1:10 w/v): 0   Alternaria tenius (1:10 w/v) 0   H. Cladosporium (1:10 w/v) 0   Phoma herbarum (1:10 w/v) 0
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
## 2022-03-14 ENCOUNTER — OFFICE VISIT (OUTPATIENT)
Dept: OTOLARYNGOLOGY | Facility: CLINIC | Age: 52
End: 2022-03-14
Payer: COMMERCIAL

## 2022-03-14 VITALS
BODY MASS INDEX: 24.95 KG/M2 | DIASTOLIC BLOOD PRESSURE: 79 MMHG | SYSTOLIC BLOOD PRESSURE: 124 MMHG | HEART RATE: 79 BPM | OXYGEN SATURATION: 96 % | HEIGHT: 70 IN | TEMPERATURE: 98.2 F | WEIGHT: 174.3 LBS

## 2022-03-14 DIAGNOSIS — J31.0 CHRONIC RHINITIS: Primary | ICD-10-CM

## 2022-03-14 PROCEDURE — 99213 OFFICE O/P EST LOW 20 MIN: CPT | Performed by: OTOLARYNGOLOGY

## 2022-03-14 RX ORDER — MUPIROCIN 20 MG/G
OINTMENT TOPICAL
Qty: 30 G | Refills: 0 | Status: SHIPPED | OUTPATIENT
Start: 2022-03-14 | End: 2022-03-24

## 2022-03-14 ASSESSMENT — PAIN SCALES - GENERAL: PAINLEVEL: EXTREME PAIN (8)

## 2022-03-14 ASSESSMENT — PATIENT HEALTH QUESTIONNAIRE - PHQ9: SUM OF ALL RESPONSES TO PHQ QUESTIONS 1-9: 19

## 2022-03-14 NOTE — PATIENT INSTRUCTIONS
"1. You were seen in the clinic today by Dr. Flores. If you have any questions or concerns after your appointment, please call the clinic at [phone]. Press \"1\" for scheduling, press \"3\" for nurse advice.    2.  Plan to return the clinic in 5 weeks.       Tasha Mei LPN  [facility]  Department of Otolaryngology  [phone]    "

## 2022-03-14 NOTE — TELEPHONE ENCOUNTER
NOTES Status Details   OFFICE NOTE from referring provider Care Everywhere  /Received 02.28.2022 Soraida Gann MD   MEDICATION LIST Internal /Care Everywhere    LABS (Any and all labs)      Internal /Care Everywhere

## 2022-03-14 NOTE — LETTER
3/14/2022       RE: Todd Bowden  [address]  [facility] [address]     Dear Colleague,    Thank you for referring your patient, Todd Bowden, to the [facility] NOSE [facility] THROAT CLINIC [place] at [facility]. Please see a copy of my visit note below.    HISTORY OF PRESENT ILLNESS:  Todd is back to see us again today. He feels that things are going better from the standpoint of his nose.  He has been following up with his primary care provider regarding his multiple other symptoms.  He is concerned about exposure of possible mold in his apartment.    PHYSICAL EXAMINATION:  On examination the patient is in no distress, alert and appropriate.  Breathing without difficulty or stridor.  Eyes are anicteric.  Skin of the head and neck appears normal.  Nose is visualized and shows diminished debris and mucus.    ASSESSMENT AND PLAN:  At this point, plan is for continued Bactroban irrigations and follow up in six weeks.  If he continues to have obstruction in his nose, we could consider turbinate reduction at some point.  Otherwise, he will let me know if things get worse.    Twenty-five minutes spent with the patient, on chart review and on documentation on the date of the visit.          Again, thank you for allowing me to participate in the care of your patient.      Sincerely,    Karl Flores MD

## 2022-03-14 NOTE — NURSING NOTE
"Chief Complaint   Patient presents with     Consult     rhinitis    Blood pressure 124/79, pulse 79, temperature 98.2  F (36.8  C), temperature source Temporal, height 1.778 m (5' 10\"), weight 79.1 kg (174 lb 4.8 oz), SpO2 96 %. Tasha Mei LPN    "

## 2022-03-14 NOTE — PROGRESS NOTES
HISTORY OF PRESENT ILLNESS:  Todd is back to see us again today. He feels that things are going better from the standpoint of his nose.  He has been following up with his primary care provider regarding his multiple other symptoms.  He is concerned about exposure of possible mold in his apartment.    PHYSICAL EXAMINATION:  On examination the patient is in no distress, alert and appropriate.  Breathing without difficulty or stridor.  Eyes are anicteric.  Skin of the head and neck appears normal.  Nose is visualized and shows diminished debris and mucus.    ASSESSMENT AND PLAN:  At this point, plan is for continued Bactroban irrigations and follow up in six weeks.  If he continues to have obstruction in his nose, we could consider turbinate reduction at some point.  Otherwise, he will let me know if things get worse.    Twenty-five minutes spent with the patient, on chart review and on documentation on the date of the visit.

## 2022-04-18 ENCOUNTER — OFFICE VISIT (OUTPATIENT)
Dept: OTOLARYNGOLOGY | Facility: CLINIC | Age: 52
End: 2022-04-18
Payer: MEDICAID

## 2022-04-18 VITALS
BODY MASS INDEX: 24.91 KG/M2 | TEMPERATURE: 98.3 F | DIASTOLIC BLOOD PRESSURE: 87 MMHG | OXYGEN SATURATION: 98 % | HEIGHT: 70 IN | HEART RATE: 74 BPM | WEIGHT: 174 LBS | SYSTOLIC BLOOD PRESSURE: 128 MMHG

## 2022-04-18 DIAGNOSIS — J01.01 ACUTE RECURRENT MAXILLARY SINUSITIS: Primary | ICD-10-CM

## 2022-04-18 PROCEDURE — 99213 OFFICE O/P EST LOW 20 MIN: CPT | Performed by: OTOLARYNGOLOGY

## 2022-04-18 RX ORDER — DOXYCYCLINE HYCLATE 100 MG
100 TABLET ORAL 2 TIMES DAILY
Qty: 42 TABLET | Refills: 0 | Status: SHIPPED | OUTPATIENT
Start: 2022-04-18 | End: 2022-05-09

## 2022-04-18 ASSESSMENT — PATIENT HEALTH QUESTIONNAIRE - PHQ9: SUM OF ALL RESPONSES TO PHQ QUESTIONS 1-9: 24

## 2022-04-18 ASSESSMENT — PAIN SCALES - GENERAL: PAINLEVEL: EXTREME PAIN (9)

## 2022-04-18 NOTE — PATIENT INSTRUCTIONS
"You were seen in the clinic today by Dr. Flores. If you have any questions or concerns after your appointment, please call the clinic at [phone]. Press \"1\" for scheduling, press \"3\" for nurse advice.    2.   The following has been recommended for you based upon your appointment today:   - Antibiotics.    3.   Plan to return the clinic in 6 weeks.       MALU Vaughan  [facility]  Department of Otolaryngology  [phone] Elizabeth Swenson RN direct line   "

## 2022-04-18 NOTE — PROGRESS NOTES
HISTORY OF PRESENT ILLNESS:  Todd is back to see us again today.  He reports that he has continued to have issues with facial pain and nasal congestion.  He reports that he felt that he got sick a couple of weeks ago from his nephew and that has exacerbated his already right chronic nasal symptoms.    PHYSICAL EXAMINATION:  The patient is in no acute distress.  Normal mood and affect, normal ability to communicate.  Alert and appropriate.  Head is normocephalic.  Cranial nerve VII is House-Brackmann I/VI bilaterally.  Breathing without difficulty or stridor.  The patient does have a septal deviation into the right side of the nose and tenderness of her left midface.    ASSESSMENT:  A 51-year-old with some chronic nasal obstruction, possible sinusitis.    PLAN:  At this point, we will plan on a course of antibiotics see him back afterwards to discuss next steps.

## 2022-04-18 NOTE — NURSING NOTE
"Chief Complaint   Patient presents with     RECHECK     Chronic rhinitis    Blood pressure 128/87, pulse 74, temperature 98.3  F (36.8  C), temperature source Temporal, height 1.778 m (5' 10\"), weight 78.9 kg (174 lb), SpO2 98 %. GLORIA Mata RN advised of PHQ9 score. LEXUS MARTINEZ LPN on 4/18/2022 at 10:20 AM      "

## 2022-04-18 NOTE — NURSING NOTE
Writer was unable to discuss with patient, provider aware of situation. Provider saw patient and discussed. Patient previously has been given a mental health provider referral.    Dorothy Elkins RN on 4/18/2022 at 10:48 AM

## 2022-04-18 NOTE — LETTER
4/18/2022       RE: Todd Bowden  [address]  [facility] [address]     Dear Colleague,    Thank you for referring your patient, Todd Bowden, to the [facility] EAR NOSE AND THROAT CLINIC [place] at [facility]. Please see a copy of my visit note below.    HISTORY OF PRESENT ILLNESS:  Todd is back to see us again today.  He reports that he has continued to have issues with facial pain and nasal congestion.  He reports that he felt that he got sick a couple of weeks ago from his nephew and that has exacerbated his already right chronic nasal symptoms.    PHYSICAL EXAMINATION:  The patient is in no acute distress.  Normal mood and affect, normal ability to communicate.  Alert and appropriate.  Head is normocephalic.  Cranial nerve VII is House-Brackmann I/VI bilaterally.  Breathing without difficulty or stridor.  The patient does have a septal deviation into the right side of the nose and tenderness of her left midface.    ASSESSMENT:  A 51-year-old with some chronic nasal obstruction, possible sinusitis.    PLAN:  At this point, we will plan on a course of antibiotics see him back afterwards to discuss next steps.          Again, thank you for allowing me to participate in the care of your patient.      Sincerely,    Karl Flores MD

## 2022-04-22 ENCOUNTER — PRE VISIT (OUTPATIENT)
Dept: RHEUMATOLOGY | Facility: CLINIC | Age: 52
End: 2022-04-22
Payer: MEDICAID

## 2022-04-22 ENCOUNTER — VIRTUAL VISIT (OUTPATIENT)
Dept: RHEUMATOLOGY | Facility: CLINIC | Age: 52
End: 2022-04-22
Attending: INTERNAL MEDICINE
Payer: MEDICAID

## 2022-04-22 DIAGNOSIS — R76.8 RHEUMATOID FACTOR POSITIVE: ICD-10-CM

## 2022-04-22 DIAGNOSIS — M02.30 REITER'S DISEASE (H): ICD-10-CM

## 2022-04-22 DIAGNOSIS — M05.79 RHEUMATOID ARTHRITIS INVOLVING MULTIPLE SITES WITH POSITIVE RHEUMATOID FACTOR (H): Primary | ICD-10-CM

## 2022-04-22 DIAGNOSIS — Z79.899 ENCOUNTER FOR LONG-TERM (CURRENT) USE OF HIGH-RISK MEDICATION: ICD-10-CM

## 2022-04-22 PROCEDURE — 99205 OFFICE O/P NEW HI 60 MIN: CPT | Mod: 95 | Performed by: INTERNAL MEDICINE

## 2022-04-22 RX ORDER — HYDROXYCHLOROQUINE SULFATE 200 MG/1
200 TABLET, FILM COATED ORAL 2 TIMES DAILY
Qty: 360 TABLET | Refills: 0 | Status: SHIPPED | OUTPATIENT
Start: 2022-04-22 | End: 2022-09-12

## 2022-04-22 RX ORDER — PREDNISONE 2.5 MG/1
TABLET ORAL
Qty: 300 TABLET | Refills: 0 | Status: SHIPPED | OUTPATIENT
Start: 2022-04-22 | End: 2022-08-20

## 2022-04-22 NOTE — PROGRESS NOTES
Todd DELEON Kal  is being evaluated via a billable video visit.      How would you like to obtain your AVS? [employer]  For the video visit, send the invitation by: Text to cell phone: [phone]  Will anyone else be joining your video visit? No      Start: 04/22/2022 11:06 am   Stop: 04/22/2022 11:25 am  [place]    Then phone from 11:26-11:57AM  [employer]ia [employer]    Ben Lepe MD  Rheumatology

## 2022-04-22 NOTE — PROGRESS NOTES
Outpatient Rheumatology Consultation  This visit was conducted via synchronous video visit due to the current COVID-19 crisis to reduce patient risk.  Verbal consent was obtained and is documented below.    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 At that time he was treated with prednisone taper and started on HCQ 200mg BID. He reports feeling well and so he self discontinued the HCQ shortly after his last visit in 2013. In 2013 he had a broad serologic work-up to include RF which was high titer at 266, ,   and negative/normal CCP, SSA, C3, C4, MUKESH, Cryo, smith, RNP, dsDNA. His inflammatory arthritis symptoms had been relatively quiet/ asymptomatic until his recent diagnosis with COVID in Dec 2021 after which he developed and has continued to experience joint pain, AM predominant joint stiffness which lasts for hours/ the whole day and while it improves with use it does not resolve during the day. Also with ongoing myalgias. Prednisone induces rapid improvement in these symptoms of joint pain/stiffness and myalgias. He was recently on 20mg daily of prednisone with good relief, however had significant AMS with this and associated ?psychosis/?PTSD and so dose decreased down to 10mg daily with improvement in these side effects though slight worsening of his joint/muscle symptoms. His symptoms are consistent with symmetric small/medium/large joint inflammatory arthritis. Given the temporal relationship to onset of his symptoms to his recent COVID infection, I suspect his symptoms are secondary to a post viral inflammatory arthritis, especially with his history of reactive  arthritis, though I do note his high titer RF of 266 when checked in the past.  His disease remains active. We discussed treating with a tapering course of prednisone to act as bridge therapy while starting DMARD therapy given the length of his symptoms ongoing for well beyond 6 weeks. He has some ETOH use which makes methotrexate not an option at this time. Discussed the risks and benefits of HCQ including retinal toxicity, need for routine screening eye exam, rash/SJS/TEN, HA, cytopenias, infection, GI upset among others. He is agreeable given potential benefit/steroid sparing effects. Will also obtain labs to include ESR, CRP, CBC, CMP, CCP, ALESIA panel. Will place referral to ophthalmology for baseline screening OCT exam. He has had difficulty tapering down from prednisone and still has active disease on his current 10mg daily dose, so will taper quite slowly.    Summary of plan:  1) Labs  2) HCQ 200mg BID  3) Prednisone 10mg daily x1 month, then 7.5mg daily x1 month, then 5mg daily x1 month, then 2.5mg daily x1 month then stop  4) Ophthalmology referral  5) Follow-up with lab appointment in about 3 months. Will be on 5mg of prednisone at that time. Should be able to more quickly taper his prednisone if he is doing well. Currently not able.    Ben Lepe MD  Rheumatology     I spent a total of 60 minutes on the date of service on chart review (prior rheumatology notes dating back to 2013, labs), patient video and then phone encounter, documentation, .     Subjective:   Had COVID Dec 22nd. Since then has been positive since late Feb. Had body aches started with that. Severe. With meningitis dx, had calcium deposits in his mouth. Painful. Had loss of finger/toe nails. Has continued to have joint pain since discharge at that time. Has had recurrent sinus infections. With joint pains with weather changes. Has been taking NSAIDs/tylenol when needed. Experiences joint cracking/clicking/catching with  joint movement. When he had COVID, he was started on prednisone and tapered to lower dose of current dose of 10mg daily. Reports pain is always there. Reports that his depression, aches while washing dishes, sitting on the toilet his leg goes to sleep, when he stands from the toilet he has pain in his knees, has numbess, also twitches. Started on 20mg prednisone by Dr Gann, which he thinks was helpful for joints. He then discontinued this due to side effects, then a few weeks ago pain was worse and so restarted on 10mg daily. Has had ongoing pain and stiffness, AM predominant. He has found that the prednisone has been very helpful in terms of allowing him to climb stairs/wash dishes/do laundry. AM is worst for his stiffness. No rashes, intermittent axillary LAD. Has continued to have fevers/chills, recent infection from son. Had kidney stone last year, has been multiple years since last STD. Off and on diarrhea, chronic. Doxycycline causes diarrhea. No changes in vision or hearing. Lost weight during covid, though has gained this back. Started ABX for 21 days, started on Monday. No blood in stool. No change in urinary symptoms. No hemoptysis or epistaxis. No symptoms consistent with raynauds. No rash consistent with psoriasis. No history of inflammatory eye diseases. No symptoms consistent with inflammatory back pain. No history consistent with dactylitis. He previously tolerated HCQ without side effects, discontinued due to feeling well. 14 point ROS collected and negative if not documented above.     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     Sepsis, unspecified 4/26/2013     Sickle cell anemia (H)     screening performed [facility] were  negative 04/2013     Skin desquamation 5/28/2013     Stress-induced cardiomyopathy 5/10/2013       Past Surgical History reviewed today    Medications  Current Outpatient Medications   Medication     acetaminophen-codeine (TYLENOL #3) 300-30 MG tablet     azelastine (ASTELIN) 0.1 % nasal spray     diphenhydrAMINE (BENADRYL) 25 MG tablet     doxycycline hyclate (VIBRA-TABS) 100 MG tablet     fluticasone (FLONASE) 50 MCG/ACT nasal spray     ibuprofen (ADVIL/MOTRIN) 600 MG tablet     ibuprofen (ADVIL/MOTRIN) 800 MG tablet     predniSONE (DELTASONE) 10 MG tablet     sodium chloride (OCEAN) 0.65 % nasal spray     traZODone (DESYREL) 50 MG tablet     No current facility-administered medications for this visit.       Allergies   Allergies   Allergen Reactions     Erythromycin Other (See Comments)     Unknown severity.  Per mother believes he had a rash in the past.  Was not hospitalized for the reaction.     Lactose Other (See Comments)     Family History: + FH of Rheumatoid arthritis    Social History: Not currently working. Ana. Smokes cigarettes, trying to cut down. Once every 2 weeks or so will drink, binges at those times. Some MJ, otherwise no drug use.      Objective:    Physical exam:  No vitals for this video visit. Vitals reviewed in Epic.  GEN: Sitting up on couch initially. Later walking around house. NAD  HEENT: no facial rash  CV: no upper extremity dependent edema  Pulm: speaking in full sentences, rare dry cough, no audible wheezing, no use of accessory muscles  Abdomen: not distended  Skin: no acute cutaneous lesions  MSK: Very limited exam. Unable to make a closed fist bilaterally/ weak  strength. Unable to touch fingertips to palms bilaterally due to stiffness/pain. Poor video quality and so cannot comment on swelling/redness/warmth. He was very slow with any shoulder/elbow/wrist active ROM due to pain/stiffness.     Labs:  WBC   Date Value Ref Range Status   09/18/2013 12.5 (H) 4.0 - 11.0 10e9/L  Final     WBC Count   Date Value Ref Range Status   09/16/2021 3.7 (L) 4.0 - 11.0 10e3/uL Final     Hemoglobin   Date Value Ref Range Status   09/16/2021 15.8 13.3 - 17.7 g/dL Final   09/18/2013 14.3 13.3 - 17.7 g/dL Final     Platelet Count   Date Value Ref Range Status   09/16/2021 189 150 - 450 10e3/uL Final   09/18/2013 212 150 - 450 10e9/L Final     Creatinine   Date Value Ref Range Status   09/16/2021 0.87 0.66 - 1.25 mg/dL Final   09/18/2013 1.01 0.66 - 1.25 mg/dL Final     Lab Results   Component Value Date    ALKPHOS 75 09/16/2021    ALKPHOS 67 09/18/2013     AST   Date Value Ref Range Status   09/16/2021 20 0 - 45 U/L Final   09/18/2013 21 0 - 45 U/L Final     Lab Results   Component Value Date    ALT 37 09/16/2021    ALT 28 09/18/2013     Sed Rate   Date Value Ref Range Status   04/30/2013 117 (H) 0 - 15 mm/h Final     CRP Inflammation   Date Value Ref Range Status   05/10/2013 57.0 (H) 0.0 - 8.0 mg/L Final     UA RESULTS:  Recent Labs   Lab Test 11/14/16  1645   COLOR Yellow   APPEARANCE Cloudy   URINEGLC Negative   URINEBILI Negative   URINEKETONE Negative   SG 1.020   UBLD Negative   URINEPH 7.5*   PROTEIN Negative   UROBILINOGEN 0.2   NITRITE Negative   LEUKEST Negative   RBCU O - 2   WBCU O - 2      MUKESH Screen by EIA   Date Value Ref Range Status   05/01/2013 <1.0  Interpretation:  Negative <1.0 Final     DNA-ds   Date Value Ref Range Status   04/20/2013 <15  Interpretation:  Negative 0 - 29 IU/mL Final     SSA (RO) Antibody IgG   Date Value Ref Range Status   05/22/2013 14  Final     Comment:     Reference range: 0 to 40  Unit: AU/mL  (Note)  INTERPRETIVE INFORMATION: SSA (Ro) (ALESIA) Ab, IgG   29 AU/mL or Less ............. Negative   30 - 40 AU/mL ................ Equivocal   41 AU/mL or Greater .......... Positive  SSA (Ro) antibody is seen in 70-75% of Sjogren syndrome  cases, 30-40% of systemic lupus erythematosus (SLE) and  5-10% of progressive systemic sclerosis (PSS).  Performed by [facility],  [address] [place] [address] [phone]  [email or web address], Amelia Cota MD, Lab. Director     Rheumatoid Factor   Date Value Ref Range Status   05/01/2013 266 (H) 0 - 14 IU/mL Final       Imaging:  WRIST G/E 3 VIEWS BILATERAL    4/30/2013 5:38 PM       HISTORY: Arthritis, effusion.       COMPARISON: None.       FINDINGS:  There is normal osseous alignment.  No fractures are   identified.  No erosive changes are seen. Note significant   degenerative change is identified.       Impression   IMPRESSION: Osseous structures are unremarkable. No significant   arthritic changes identified on these images.

## 2022-04-22 NOTE — PATIENT INSTRUCTIONS
1) prednisone ordered  2) hydroxychloroquine ordered  3) Ophthalmology referral placed- please call to schedule this.  4) We will call you to schedule a follow-up and lab appointment in about 3 months    Please let me know if anything comes up prior to that appointment.    Pleasure meeting you today.    Thanks  Ben

## 2022-04-28 ENCOUNTER — OFFICE VISIT (OUTPATIENT)
Dept: OPHTHALMOLOGY | Facility: CLINIC | Age: 52
End: 2022-04-28
Attending: OPHTHALMOLOGY
Payer: MEDICAID

## 2022-04-28 DIAGNOSIS — Z79.899 ENCOUNTER FOR LONG-TERM (CURRENT) USE OF HIGH-RISK MEDICATION: ICD-10-CM

## 2022-04-28 DIAGNOSIS — M05.79 RHEUMATOID ARTHRITIS INVOLVING MULTIPLE SITES WITH POSITIVE RHEUMATOID FACTOR (H): ICD-10-CM

## 2022-04-28 PROCEDURE — 92134 CPTRZ OPH DX IMG PST SGM RTA: CPT | Performed by: OPHTHALMOLOGY

## 2022-04-28 PROCEDURE — G0463 HOSPITAL OUTPT CLINIC VISIT: HCPCS | Mod: 25

## 2022-04-28 PROCEDURE — 92083 EXTENDED VISUAL FIELD XM: CPT | Performed by: OPHTHALMOLOGY

## 2022-04-28 PROCEDURE — 99204 OFFICE O/P NEW MOD 45 MIN: CPT | Mod: GC | Performed by: OPHTHALMOLOGY

## 2022-04-28 PROCEDURE — 99207 FUNDUS AUTOFLUORESCENCE IMAGE (FAF) OU (BOTH EYES): CPT | Mod: GC | Performed by: OPHTHALMOLOGY

## 2022-04-28 PROCEDURE — 92015 DETERMINE REFRACTIVE STATE: CPT

## 2022-04-28 PROCEDURE — 92250 FUNDUS PHOTOGRAPHY W/I&R: CPT | Performed by: OPHTHALMOLOGY

## 2022-04-28 ASSESSMENT — SLIT LAMP EXAM - LIDS
COMMENTS: FLOPPY EYELIDS
COMMENTS: FLOPPY EYELIDS

## 2022-04-28 ASSESSMENT — REFRACTION_MANIFEST
OD_ADD: +2.50
OS_CYLINDER: +1.75
OS_AXIS: 020
OD_CYLINDER: +0.50
OS_SPHERE: +0.00
OD_SPHERE: +0.75
OD_AXIS: 180
OS_ADD: +2.50

## 2022-04-28 ASSESSMENT — VISUAL ACUITY
OD_SC+: +1
OS_SC+: +1
OS_SC: 20/50
OD_PH_SC: 20/25
METHOD: SNELLEN - LINEAR
OD_SC: 20/50
OD_PH_SC+: -2

## 2022-04-28 ASSESSMENT — CONF VISUAL FIELD
OD_NORMAL: 1
METHOD: COUNTING FINGERS
OS_SUPERIOR_TEMPORAL_RESTRICTION: 3

## 2022-04-28 ASSESSMENT — ENCOUNTER SYMPTOMS: JOINT SWELLING: 1

## 2022-04-28 ASSESSMENT — EXTERNAL EXAM - LEFT EYE: OS_EXAM: NORMAL

## 2022-04-28 ASSESSMENT — CUP TO DISC RATIO
OS_RATIO: 0.55
OD_RATIO: 0.55

## 2022-04-28 ASSESSMENT — EXTERNAL EXAM - RIGHT EYE: OD_EXAM: NORMAL

## 2022-04-28 ASSESSMENT — TONOMETRY
OS_IOP_MMHG: 13
OD_IOP_MMHG: 11
IOP_METHOD: TONOPEN

## 2022-04-28 NOTE — PROGRESS NOTES
Chief Complaint(s) and History of Present Illness(es)     Consult For     Associated symptoms: eye pain    Comments: Pt here for referral from Dr Ben Lepe for Rheumatoid   arthritis involving multiple sites with positive rheumatoid factor- high   risk medication use.         Monitor Current High Risk Meds     Laterality: both eyes    Onset: gradual    Severity: severe    Frequency: constantly    Associated symptoms: eye pain    Pain scale: 6/10       Comments     Todd is here as a new patient starting high risk medication   Hydroxychloroquine for RA. History of rheumatoid arthritis. His medical   team is trying to ween him off Pred.   He states he has pain all over  including in his eyes. He had glasses but   they broke.     Obed Jason COT 9:36 AM April 28, 2022        Review of systems for the eyes was negative other than the pertinent positives/negatives listed in the HPI.      Ocular History:  Blunt trauma OS    Ocular Medications:  None    PMH:  RA on plaquenil      Assessment & Plan      Todd Bowden is a 51 year old male with the following diagnoses:   1. Rheumatoid arthritis involving multiple sites with positive rheumatoid factor (H)    2. Encounter for long-term (current) use of high-risk medication       Plans plaquenil 400 mg daily for RA, not yet started  - Referral from Dr Ben Lepe  - Baseline mac top with scattered deficits and depressed MD/high FP left eye  - Mac OCT WNL  - FAF WNL  - Annual Dilated fundus exam  - Return precautions reviewed   Per AAO guidelines, we will continue to monitor with dilated fundus exam annually.    Will repeat 10-2 and OCT macula annually after 2027    Eye pain  - Mild, worse during COVID, no obvious etiology today but comorbid RA  - No evidence of inflammation/scleritis  - Would favor lubricating the eye prn  - Likely nonophthalmic pain, complains of whole body aches rather than eyes specifically    Subnormal VA left eye  Refractive error  - Moderate  asymmetric astigmatism   - Thinks vision was normal as a child  - Clear visual axis, normal OCT macula, no obvious etiology but cyl could be mildly amblyogenic  - More dependent on glasses left eye and has been without them recently  - Explained and dispensed MRx      Patient disposition:   Return in about 1 year (around 4/28/2023) for VTD + MRx + OCT Macula.         Addy Huggins, PGY4  Ophthalmology Resident    Attending Physician Attestation:  Complete documentation of historical and exam elements from today's encounter can be found in the full encounter summary report (not reduplicated in this progress note).  I personally obtained the chief complaint(s) and history of present illness.  I confirmed and edited as necessary the review of systems, past medical/surgical history, family history, social history, and examination findings as documented by others; and I examined the patient myself.  I personally reviewed the relevant tests, images, and reports as documented above.  I formulated and edited as necessary the assessment and plan and discussed the findings and management plan with the patient and family. Attending Physician Image/Tesing Attestation: I personally reviewed the ophthalmic test(s) associated with this encounter, agree with the interpretation(s) as documented by the resident/fellow, and have edited the corresponding report(s) as necessary. I spent a total of 30 minutes face to face with the patient.  Over 50% of this time was spent counseling and coordinating care regarding their diagnosis and management.    . - Nir Humphreys MD

## 2022-04-28 NOTE — NURSING NOTE
Chief Complaints and History of Present Illnesses   Patient presents with     Consult For     Pt here for referral from Dr Ben Lepe for Rheumatoid arthritis involving multiple sites with positive rheumatoid factor- high risk medication use.      Monitor Current High Risk Meds     Chief Complaint(s) and History of Present Illness(es)     Consult For     Associated symptoms: eye pain    Comments: Pt here for referral from Dr Ben Lepe for Rheumatoid arthritis involving multiple sites with positive rheumatoid factor- high risk medication use.               Monitor Current High Risk Meds     Laterality: both eyes    Onset: gradual    Severity: severe    Frequency: constantly    Associated symptoms: eye pain    Pain scale: 6/10              Comments     Todd is here as a new patient starting high risk medication Hydroxychloroquine for RA. History of rheumatoid arthritis. His medical team is trying to ween him off Pred.   He states he has pain all over  including in his eyes. He had glasses but they broke.     Obed Jason COT 9:36 AM April 28, 2022

## 2022-05-23 ENCOUNTER — OFFICE VISIT (OUTPATIENT)
Dept: OTOLARYNGOLOGY | Facility: CLINIC | Age: 52
End: 2022-05-23
Payer: MEDICAID

## 2022-05-23 VITALS
BODY MASS INDEX: 24.51 KG/M2 | WEIGHT: 171.2 LBS | DIASTOLIC BLOOD PRESSURE: 76 MMHG | HEART RATE: 88 BPM | HEIGHT: 70 IN | SYSTOLIC BLOOD PRESSURE: 115 MMHG | OXYGEN SATURATION: 98 % | TEMPERATURE: 97.5 F

## 2022-05-23 DIAGNOSIS — J01.01 ACUTE RECURRENT MAXILLARY SINUSITIS: Primary | ICD-10-CM

## 2022-05-23 PROCEDURE — 99213 OFFICE O/P EST LOW 20 MIN: CPT | Performed by: OTOLARYNGOLOGY

## 2022-05-23 ASSESSMENT — PAIN SCALES - GENERAL: PAINLEVEL: SEVERE PAIN (7)

## 2022-05-23 ASSESSMENT — PATIENT HEALTH QUESTIONNAIRE - PHQ9: SUM OF ALL RESPONSES TO PHQ QUESTIONS 1-9: 17

## 2022-05-23 NOTE — NURSING NOTE
"Chief Complaint   Patient presents with     RECHECK     Chronic rhinitis    Blood pressure 115/76, pulse 88, temperature 97.5  F (36.4  C), temperature source Temporal, height 1.778 m (5' 10\"), weight 77.7 kg (171 lb 3.2 oz), SpO2 98 %. Tasha Mei LPN    "

## 2022-05-23 NOTE — LETTER
5/23/2022       RE: Todd Bowden  [address]  [place] [facility] [address]     Dear Colleague,    Thank you for referring your patient, Todd Bowden, to the [facility] [place] at [facility]. Please see a copy of my visit note below.    Patient scored high on PHQ9 questions. OPAL Swenson notified. Patient notes he has a mental health provider and saw them one week ago.    LEXUS MARTINEZ LPN on 5/23/2022 at 1:40 PM      HISTORY OF PRESENT ILLNESS:  Todd is back to see us again today.  He feels that the antibiotics helped with his sinusitis.  He continues to have problems with nasal obstruction.    PHYSICAL EXAMINATION:  On examination, the patient is in no distress, alert and appropriate.  Breathing without difficulty or stridor.  Eyes are anicteric.  Skin of the head and neck appears normal.  Examination of the nose shows septal deviation to the right and inferior turbinate hypertrophy.    ASSESSMENT/PLAN:  Today, we discussed options.  He would like to have a course of antibiotics on hand in case he needs it.  I have prescribed those for him to be used should his symptoms of sinusitis get worse again.  Otherwise, we discussed septoplasty and turbinate reduction.  He will think about this and let us know in the future would like to do.  Otherwise, we will see him back in six weeks or so.    A total of 20 minutes spent with the patient, on chart review and on documentation on the date of the visit.    Again, thank you for allowing me to participate in the care of your patient.      Sincerely,    Karl Flores MD

## 2022-05-23 NOTE — PROGRESS NOTES
HISTORY OF PRESENT ILLNESS:  Todd is back to see us again today.  He feels that the antibiotics helped with his sinusitis.  He continues to have problems with nasal obstruction.    PHYSICAL EXAMINATION:  On examination, the patient is in no distress, alert and appropriate.  Breathing without difficulty or stridor.  Eyes are anicteric.  Skin of the head and neck appears normal.  Examination of the nose shows septal deviation to the right and inferior turbinate hypertrophy.    ASSESSMENT/PLAN:  Today, we discussed options.  He would like to have a course of antibiotics on hand in case he needs it.  I have prescribed those for him to be used should his symptoms of sinusitis get worse again.  Otherwise, we discussed septoplasty and turbinate reduction.  He will think about this and let us know in the future would like to do.  Otherwise, we will see him back in six weeks or so.    A total of 20 minutes spent with the patient, on chart review and on documentation on the date of the visit.

## 2022-05-23 NOTE — PATIENT INSTRUCTIONS
"You were seen in the clinic today by Dr. Flores. If you have any questions or concerns after your appointment, please call the clinic at [phone]. Press \"1\" for scheduling, press \"3\" for nurse advice.    2.  Plan to return the clinic in 6 weeks.      MALU Vaughan  [facility]  Department of Otolaryngology  [phone] Elizabeth Swenson RN direct line   "

## 2022-05-23 NOTE — PROGRESS NOTES
Patient scored high on PHQ9 questions. OPAL Swenson notified. Patient notes he has a mental health provider and saw them one week ago.    LEXUS MARTINEZ LPN on 5/23/2022 at 1:40 PM

## 2022-07-11 ENCOUNTER — OFFICE VISIT (OUTPATIENT)
Dept: OTOLARYNGOLOGY | Facility: CLINIC | Age: 52
End: 2022-07-11
Payer: MEDICAID

## 2022-07-11 VITALS
WEIGHT: 172.9 LBS | HEIGHT: 70 IN | HEART RATE: 82 BPM | TEMPERATURE: 97.7 F | DIASTOLIC BLOOD PRESSURE: 76 MMHG | SYSTOLIC BLOOD PRESSURE: 124 MMHG | BODY MASS INDEX: 24.75 KG/M2

## 2022-07-11 DIAGNOSIS — R51.9 FACIAL PAIN: Primary | ICD-10-CM

## 2022-07-11 PROCEDURE — 99213 OFFICE O/P EST LOW 20 MIN: CPT | Performed by: OTOLARYNGOLOGY

## 2022-07-11 RX ORDER — DOXYCYCLINE HYCLATE 100 MG
100 TABLET ORAL 2 TIMES DAILY
Qty: 20 TABLET | Refills: 0 | Status: SHIPPED | OUTPATIENT
Start: 2022-07-11 | End: 2022-09-12

## 2022-07-11 RX ORDER — PREDNISONE 5 MG/1
TABLET ORAL
Qty: 40 TABLET | Refills: 0 | Status: SHIPPED | OUTPATIENT
Start: 2022-07-11 | End: 2024-09-17

## 2022-07-11 ASSESSMENT — PAIN SCALES - GENERAL: PAINLEVEL: EXTREME PAIN (8)

## 2022-07-11 NOTE — NURSING NOTE
"Chief Complaint   Patient presents with     Follow Up    Blood pressure 124/76, pulse 82, temperature 97.7  F (36.5  C), height 1.778 m (5' 10\"), weight 78.4 kg (172 lb 14.4 oz). Jose Shields RN     "

## 2022-07-11 NOTE — PROGRESS NOTES
HISTORY OF PRESENT ILLNESS:  The patient is here to see us today.  He has been having substantial pain in his joints.  He has been trying to reduce his prednisone and is working with a rheumatologist on this.  Despite that, and starting Plaquenil, he has had persistent and exacerbating symptoms.  He reports that in addition, he has been having persistent left sided facial pain.  Previous CT scan did not demonstrate anything there.  He feels that he has really difficult to control pain throughout his body.    PHYSICAL EXAMINATION:  Today, the patient is in no distress, alert and appropriate.  Breathing without difficulty or stridor.  Eyes are anicteric.  Skin of the head and neck appears normal.    Examination of the nose shows septal deviation to the right without polyps or purulence.  Examination of the ears show normal external auditory canals and tympanic membranes.    ASSESSMENT:  A 52-year-old with some persistent pain throughout his body including his face.      PLAN:  For his facial pain of unclear etiology, we will get an MRI scan.  Otherwise, I have refilled 75 mg tabs for prednisone to be used if his pain is intolerable.  He knows that the plan should be to follow up and coordinate the treatment of this with his rheumatologist.  At this point, we will see him back after the MRI.

## 2022-07-11 NOTE — LETTER
7/11/2022       RE: Todd Bowden  [address]  [facility] [address]     Dear Colleague,    Thank you for referring your patient, Todd Bowden, to the [facility] NOSE [facility] THROAT CLINIC [place] at [facility]. Please see a copy of my visit note below.    HISTORY OF PRESENT ILLNESS:  The patient is here to see us today.  He has been having substantial pain in his joints.  He has been trying to reduce his prednisone and is working with a rheumatologist on this.  Despite that, and starting Plaquenil, he has had persistent and exacerbating symptoms.  He reports that in addition, he has been having persistent left sided facial pain.  Previous CT scan did not demonstrate anything there.  He feels that he has really difficult to control pain throughout his body.    PHYSICAL EXAMINATION:  Today, the patient is in no distress, alert and appropriate.  Breathing without difficulty or stridor.  Eyes are anicteric.  Skin of the head and neck appears normal.    Examination of the nose shows septal deviation to the right without polyps or purulence.  Examination of the ears show normal external auditory canals and tympanic membranes.    ASSESSMENT:  A 52-year-old with some persistent pain throughout his body including his face.      PLAN:  For his facial pain of unclear etiology, we will get an MRI scan.  Otherwise, I have refilled 75 mg tabs for prednisone to be used if his pain is intolerable.  He knows that the plan should be to follow up and coordinate the treatment of this with his rheumatologist.  At this point, we will see him back after the MRI.          Again, thank you for allowing me to participate in the care of your patient.      Sincerely,    Karl Flores MD

## 2022-07-11 NOTE — PATIENT INSTRUCTIONS
"You were seen in the clinic today by Dr. Flores. If you have any questions or concerns after your appointment, please call the clinic at [phone]. Press \"1\" for scheduling, press \"3\" for nurse advice.    2.   The following has been recommended for you based upon your appointment today:   - MRI Brain with and without contrast.    3.   Plan to return the clinic in 6-8 weeks.       Gavi Huynh, [facility]  Department of Otolaryngology  [phone]    "

## 2022-07-15 ENCOUNTER — VIRTUAL VISIT (OUTPATIENT)
Dept: RHEUMATOLOGY | Facility: CLINIC | Age: 52
End: 2022-07-15
Attending: INTERNAL MEDICINE
Payer: MEDICAID

## 2022-07-15 DIAGNOSIS — Z79.899 HIGH RISK MEDICATION USE: ICD-10-CM

## 2022-07-15 DIAGNOSIS — M05.79 RHEUMATOID ARTHRITIS INVOLVING MULTIPLE SITES WITH POSITIVE RHEUMATOID FACTOR (H): Primary | ICD-10-CM

## 2022-07-15 PROCEDURE — 99214 OFFICE O/P EST MOD 30 MIN: CPT | Mod: GT | Performed by: INTERNAL MEDICINE

## 2022-07-15 PROCEDURE — G0463 HOSPITAL OUTPT CLINIC VISIT: HCPCS | Mod: PN,RTG | Performed by: INTERNAL MEDICINE

## 2022-07-15 RX ORDER — PREDNISONE 5 MG/1
5 TABLET ORAL DAILY
Qty: 90 TABLET | Refills: 0 | Status: SHIPPED | OUTPATIENT
Start: 2022-07-15 | End: 2022-10-14

## 2022-07-15 RX ORDER — MELOXICAM 15 MG/1
15 TABLET ORAL DAILY
Qty: 90 TABLET | Refills: 1 | Status: SHIPPED | OUTPATIENT
Start: 2022-07-15 | End: 2022-10-14

## 2022-07-15 ASSESSMENT — PATIENT HEALTH QUESTIONNAIRE - PHQ9: SUM OF ALL RESPONSES TO PHQ QUESTIONS 1-9: 16

## 2022-07-15 NOTE — PATIENT INSTRUCTIONS
1) Labs now  2) Continue on prednisone 5mg daily   3) Continue on plaquenil 1 tab twice daily  4) Start mobic one daily- no other NSAIDs in the 24 hours after each dose. No problem with tylenol    I'll see you back in 3 months for follow-up

## 2022-07-15 NOTE — LETTER
7/15/2022       RE: Todd Bowden  [address]  [facility] [address]     Dear Colleague,    Thank you for referring your patient, Todd Bowden, to the [facility]HE[facility]ATOLOG[facility] [place] at [facility]. Please see a copy of my visit note below.      Outpatient Rheumatology Follow-up  This visit was conducted via synchronous video visit due to the current COVID-19 crisis to reduce patient risk.  Verbal consent was obtained and is documented below.    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 In April 2022, he reports he was feeling well and so he self discontinued the HCQ shortly after his last visit with Dr Castillo in 2013. In 2013 he had a broad serologic work-up to include RF which was high titer at 266, ,   and negative/normal CCP, SSA, C3, C4, MUKESH, Cryo, smith, RNP, dsDNA. His inflammatory arthritis symptoms had been relatively quiet/ asymptomatic until his recent diagnosis with COVID in Dec 2021 after which he developed and up until his consultation with me continued to experience joint pain, AM predominant joint stiffness lasting for hours/ the whole day and while it improves with use it does not resolve during the day. Also with ongoing myalgias. Prednisone induces rapid improvement in these symptoms of joint pain/stiffness and myalgias. He was recently on 20mg daily of prednisone with good relief, however had significant AMS with this  and associated ?psychosis/?PTSD and so dose decreased down to 10mg daily with improvement in these side effects though slight worsening of his joint/muscle symptoms. His symptoms are consistent with symmetric small/medium/large joint inflammatory arthritis. Given the temporal relationship to onset of his symptoms to his recent COVID infection, I suspect his symptoms are secondary to a post viral inflammatory arthritis vs high titer seropositive rheumatoid arthritis ( RF of 266 when checked in the past). At the time of his initial consultation, I ordered ESR, CRP, CBC, CMP, CCP, ALESIA panel which he did not have drawn. We discussed the use of methotrexate for inflammatory arthritis, though his ETOH use makes this not a safe option. We instead proceeded with slow steroid taper from 10mg down to 5mg daily, which he continues on today, along with starting HCQ 200mg BID. He returns today for follow-up.    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 As a result, will plan to continue on 5mg prednisone daily, HCQ 200mg BID, and start meloxicam 15mg daily. He knows that he cannot take any other NSAIDs in the 24 hours after each dose. Counseled that he needs to get labs done which were ordered at the time of his last visit. Follow-up in 12 weeks.    High risk medication use: HCQ  ---Risks and benefits of HCQ discussed today to include rash (including severe rash/SJS/TEN), HA, GI upset, hepatoxicity, retinal toxicity, need for yearly screening OCT exam, need for CBC, CMP, ESR, CRP for routine screening drug toxicity labs among others. Patient is agreeable.   --He had his ophthalmology initial consultation for baseline screening OCT exam on 4/28/22, due again April 2023   --Standing q6 month CBC, CMP, ESR, CRP ordered for routine drug  toxicity screening labs. He will have these done now.    Ben Lepe MD  Rheumatology       Subjective:   Interval History 7/15/22  Was weaning off of prednisone, got down to 2.5mg daily which was when he noticed pain returned with bad weather. So as previously discussed, he increased the dose back up to the previously effective dose of 5mg daily and he continues on that dose today. Depending on time of day/when he takes his prednisone form waking. Still with paresthesias in LE when sitting on the toilet. Has periods of knees giving out on him and his legs give out- predominantly ankle/foot on the left side. Intermittently will have some swelling on that side. Taking HCQ 200mg BID without noticeable side effects. Switched to doxycyline from amox/clav. Had lots of diarrhea from this. 2 weeks ago had sick contacts (one child with COVID), he was having some mild SOB, though now resolved. Checked COVID test and negative. Joints very affected by humidity. 14 point review of systems collected and negative if not documented above.    HPI from initial consultation April 2022  Had COVID Dec 22nd. Since then has been positive since late Feb. Had body aches started with that. Severe. With meningitis dx, had calcium deposits in his mouth. Painful. Had loss of finger/toe nails. Has continued to have joint pain since discharge at that time. Has had recurrent sinus infections. With joint pains with weather changes. Has been taking NSAIDs/tylenol when needed. Experiences joint cracking/clicking/catching with joint movement. When he had COVID, he was started on prednisone and tapered to lower dose of current dose of 10mg daily. Reports pain is always there. Reports that his depression, aches while washing dishes, sitting on the toilet his leg goes to sleep, when he stands from the toilet he has pain in his knees, has numbess, also twitches. Started on 20mg prednisone by Dr Gann, which he thinks was helpful for joints. He then  discontinued this due to side effects, then a few weeks ago pain was worse and so restarted on 10mg daily. Has had ongoing pain and stiffness, AM predominant. He has found that the prednisone has been very helpful in terms of allowing him to climb stairs/wash dishes/do laundry. AM is worst for his stiffness. No rashes, intermittent axillary LAD. Has continued to have fevers/chills, recent infection from son. Had kidney stone last year, has been multiple years since last STD. Off and on diarrhea, chronic. Doxycycline causes diarrhea. No changes in vision or hearing. Lost weight during covid, though has gained this back. Started ABX for 21 days, started on Monday. No blood in stool. No change in urinary symptoms. No hemoptysis or epistaxis. No symptoms consistent with raynauds. No rash consistent with psoriasis. No history of inflammatory eye diseases. No symptoms consistent with inflammatory back pain. No history consistent with dactylitis. He previously tolerated HCQ without side effects, discontinued due to feeling well. 14 point ROS collected and negative if not documented above.     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     Sepsis, unspecified 4/26/2013     Sickle cell anemia (H)     screening performed at [facility] were negative 04/2013     Skin desquamation 5/28/2013     Stress-induced cardiomyopathy 5/10/2013       Past Surgical History reviewed today    Medications  Current Outpatient Medications   Medication     azelastine (ASTELIN) 0.1 % nasal spray     diphenhydrAMINE (BENADRYL) 25 MG tablet     doxycycline hyclate (VIBRA-TABS) 100 MG tablet     fluticasone (FLONASE) 50 MCG/ACT nasal spray     hydroxychloroquine (PLAQUENIL) 200 MG  tablet     ibuprofen (ADVIL/MOTRIN) 600 MG tablet     ibuprofen (ADVIL/MOTRIN) 800 MG tablet     predniSONE (DELTASONE) 10 MG tablet     predniSONE (DELTASONE) 2.5 MG tablet     predniSONE (DELTASONE) 5 MG tablet     traZODone (DESYREL) 50 MG tablet     No current facility-administered medications for this visit.       Allergies   Allergies   Allergen Reactions     Erythromycin Other (See Comments)     Unknown severity.  Per mother believes he had a rash in the past.  Was not hospitalized for the reaction.     Lactose Other (See Comments)     Family History: + FH of Rheumatoid arthritis    Social History: Not currently working. Ana. Smokes cigarettes, trying to cut down. Once every 2 weeks or so will drink, binges at those times. Some MJ, otherwise no drug use.      Objective:    Physical exam:  No vitals for this video visit. Vitals reviewed in Epic.  GEN: Sitting up on couch unassisted, NAD, pleasant  HEENT: no facial rash  CV: no upper extremity dependent edema  Pulm: speaking in full sentences, rare dry cough, no audible wheezing, no use of accessory muscles  Abdomen: not distended  Skin: no acute cutaneous lesions  MSK: Very limited exam. Unable to make a closed fist bilaterally/ weak  strength. Unable to touch fingertips to palms bilaterally due to stiffness/pain.    Labs:  WBC   Date Value Ref Range Status   09/18/2013 12.5 (H) 4.0 - 11.0 10e9/L Final     WBC Count   Date Value Ref Range Status   09/16/2021 3.7 (L) 4.0 - 11.0 10e3/uL Final     Hemoglobin   Date Value Ref Range Status   09/16/2021 15.8 13.3 - 17.7 g/dL Final   09/18/2013 14.3 13.3 - 17.7 g/dL Final     Platelet Count   Date Value Ref Range Status   09/16/2021 189 150 - 450 10e3/uL Final   09/18/2013 212 150 - 450 10e9/L Final     Creatinine   Date Value Ref Range Status   09/16/2021 0.87 0.66 - 1.25 mg/dL Final   09/18/2013 1.01 0.66 - 1.25 mg/dL Final     Lab Results   Component Value Date    ALKPHOS 75 09/16/2021    ALKPHOS 67  09/18/2013     AST   Date Value Ref Range Status   09/16/2021 20 0 - 45 U/L Final   09/18/2013 21 0 - 45 U/L Final     Lab Results   Component Value Date    ALT 37 09/16/2021    ALT 28 09/18/2013     Sed Rate   Date Value Ref Range Status   04/30/2013 117 (H) 0 - 15 mm/h Final     CRP Inflammation   Date Value Ref Range Status   05/10/2013 57.0 (H) 0.0 - 8.0 mg/L Final     UA RESULTS:  Recent Labs   Lab Test 11/14/16  1645   COLOR Yellow   APPEARANCE Cloudy   URINEGLC Negative   URINEBILI Negative   URINEKETONE Negative   SG 1.020   UBLD Negative   URINEPH 7.5*   PROTEIN Negative   UROBILINOGEN 0.2   NITRITE Negative   LEUKEST Negative   RBCU O - 2   WBCU O - 2      MUKESH Screen by EIA   Date Value Ref Range Status   05/01/2013 <1.0  Interpretation:  Negative <1.0 Final     DNA-ds   Date Value Ref Range Status   04/20/2013 <15  Interpretation:  Negative 0 - 29 IU/mL Final     SSA (RO) Antibody IgG   Date Value Ref Range Status   05/22/2013 14  Final     Comment:     Reference range: 0 to 40  Unit: AU/mL  (Note)  INTERPRETIVE INFORMATION: SSA (Ro) (ALESIA) Ab, IgG   29 AU/mL or Less ............. Negative   30 - 40 AU/mL ................ Equivocal   41 AU/mL or Greater .......... Positive  SSA (Ro) antibody is seen in 70-75% of Sjogren syndrome  cases, 30-40% of systemic lupus erythematosus (SLE) and  5-10% of progressive systemic sclerosis (PSS).  Performed by [facility],  [address] [place] [address],[place] [address] [phone]  [email or web address], Amelia Cota MD, Lab. Director     Rheumatoid Factor   Date Value Ref Range Status   05/01/2013 266 (H) 0 - 14 IU/mL Final       Imaging:  WRIST G/E 3 VIEWS BILATERAL    4/30/2013 5:38 PM       HISTORY: Arthritis, effusion.       COMPARISON: None.       FINDINGS:  There is normal osseous alignment.  No fractures are   identified.  No erosive changes are seen. Note significant   degenerative change is identified.       Impression   IMPRESSION: Osseous structures are unremarkable.  No significant   arthritic changes identified on these images.                   Todd is a 52 year old who is being evaluated via a billable video visit.      How would you like to obtain your AVS? MyChart  If the video visit is dropped, the invitation should be resent by: text link and email link sent  Will anyone else be joining your video visit? No        Video-Visit Details    Video Start Time: 10:59AM    Type of service:  Video Visit    Video End Time:11:26AM    Originating Location (pt. Location): Home    Distant Location (provider location):  [facility] RHEUMATOLOGY [facility] [place]     Platform used for Video Visit: Kev Lepe MD  Rheumatology  July 15, 2022

## 2022-07-15 NOTE — PROGRESS NOTES
Todd is a 52 year old who is being evaluated via a billable video visit.      How would you like to obtain your AVS? MyChart  If the video visit is dropped, the invitation should be resent by: text link and email link sent  Will anyone else be joining your video visit? No        Video-Visit Details    Video Start Time: 10:59AM    Type of service:  Video Visit    Video End Time:11:26AM    Originating Location (pt. Location): Home    Distant Location (provider location):  [facility] RHEUMATOLOGY CLIN[facility] [place]     Platform used for Video Visit: Kev Lepe MD  Rheumatology  July 15, 2022

## 2022-07-15 NOTE — PROGRESS NOTES
Outpatient Rheumatology Follow-up  This visit was conducted via synchronous video visit due to the current COVID-19 crisis to reduce patient risk.  Verbal consent was obtained and is documented below.    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 In April 2022, he reports he was feeling well and so he self discontinued the HCQ shortly after his last visit with Dr Castillo in 2013. In 2013 he had a broad serologic work-up to include RF which was high titer at 266, ,   and negative/normal CCP, SSA, C3, C4, MUKESH, Cryo, smith, RNP, dsDNA. His inflammatory arthritis symptoms had been relatively quiet/ asymptomatic until his recent diagnosis with COVID in Dec 2021 after which he developed and up until his consultation with me continued to experience joint pain, AM predominant joint stiffness lasting for hours/ the whole day and while it improves with use it does not resolve during the day. Also with ongoing myalgias. Prednisone induces rapid improvement in these symptoms of joint pain/stiffness and myalgias. He was recently on 20mg daily of prednisone with good relief, however had significant AMS with this and associated ?psychosis/?PTSD and so dose decreased down to 10mg daily with improvement in these side effects though slight worsening of his joint/muscle symptoms. His symptoms are consistent with symmetric small/medium/large joint inflammatory arthritis. Given the temporal relationship to onset of his symptoms to his  recent COVID infection, I suspect his symptoms are secondary to a post viral inflammatory arthritis vs high titer seropositive rheumatoid arthritis ( RF of 266 when checked in the past). At the time of his initial consultation, I ordered ESR, CRP, CBC, CMP, CCP, ALESIA panel which he did not have drawn. We discussed the use of methotrexate for inflammatory arthritis, though his ETOH use makes this not a safe option. We instead proceeded with slow steroid taper from 10mg down to 5mg daily, which he continues on today, along with starting HCQ 200mg BID. He returns today for follow-up.    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 As a result, will plan to continue on 5mg prednisone daily, HCQ 200mg BID, and start meloxicam 15mg daily. He knows that he cannot take any other NSAIDs in the 24 hours after each dose. Counseled that he needs to get labs done which were ordered at the time of his last visit. Follow-up in 12 weeks.    High risk medication use: HCQ  ---Risks and benefits of HCQ discussed today to include rash (including severe rash/SJS/TEN), HA, GI upset, hepatoxicity, retinal toxicity, need for yearly screening OCT exam, need for CBC, CMP, ESR, CRP for routine screening drug toxicity labs among others. Patient is agreeable.   --He had his ophthalmology initial consultation for baseline screening OCT exam on 4/28/22, due again April 2023   --Standing q6 month CBC, CMP, ESR, CRP ordered for routine drug toxicity screening labs. He will have these done now.    Ben Lepe MD  Rheumatology       Subjective:   Interval History 7/15/22  Was weaning off of prednisone, got down to 2.5mg daily which was when he noticed pain returned with bad weather. So as previously discussed, he increased the dose back up to the previously  effective dose of 5mg daily and he continues on that dose today. Depending on time of day/when he takes his prednisone form waking. Still with paresthesias in LE when sitting on the toilet. Has periods of knees giving out on him and his legs give out- predominantly ankle/foot on the left side. Intermittently will have some swelling on that side. Taking HCQ 200mg BID without noticeable side effects. Switched to doxycyline from amox/clav. Had lots of diarrhea from this. 2 weeks ago had sick contacts (one child with COVID), he was having some mild SOB, though now resolved. Checked COVID test and negative. Joints very affected by humidity. 14 point review of systems collected and negative if not documented above.    HPI from initial consultation April 2022  Had COVID Dec 22nd. Since then has been positive since late Feb. Had body aches started with that. Severe. With meningitis dx, had calcium deposits in his mouth. Painful. Had loss of finger/toe nails. Has continued to have joint pain since discharge at that time. Has had recurrent sinus infections. With joint pains with weather changes. Has been taking NSAIDs/tylenol when needed. Experiences joint cracking/clicking/catching with joint movement. When he had COVID, he was started on prednisone and tapered to lower dose of current dose of 10mg daily. Reports pain is always there. Reports that his depression, aches while washing dishes, sitting on the toilet his leg goes to sleep, when he stands from the toilet he has pain in his knees, has numbess, also twitches. Started on 20mg prednisone by Dr Gann, which he thinks was helpful for joints. He then discontinued this due to side effects, then a few weeks ago pain was worse and so restarted on 10mg daily. Has had ongoing pain and stiffness, AM predominant. He has found that the prednisone has been very helpful in terms of allowing him to climb stairs/wash dishes/do laundry. AM is worst for his stiffness. No rashes,  intermittent axillary LAD. Has continued to have fevers/chills, recent infection from son. Had kidney stone last year, has been multiple years since last STD. Off and on diarrhea, chronic. Doxycycline causes diarrhea. No changes in vision or hearing. Lost weight during covid, though has gained this back. Started ABX for 21 days, started on Monday. No blood in stool. No change in urinary symptoms. No hemoptysis or epistaxis. No symptoms consistent with raynauds. No rash consistent with psoriasis. No history of inflammatory eye diseases. No symptoms consistent with inflammatory back pain. No history consistent with dactylitis. He previously tolerated HCQ without side effects, discontinued due to feeling well. 14 point ROS collected and negative if not documented above.     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     Sepsis, unspecified 4/26/2013     Sickle cell anemia (H)     screening performed at [facility] were negative 04/2013     Skin desquamation 5/28/2013     Stress-induced cardiomyopathy 5/10/2013       Past Surgical History reviewed today    Medications  Current Outpatient Medications   Medication     azelastine (ASTELIN) 0.1 % nasal spray     diphenhydrAMINE (BENADRYL) 25 MG tablet     doxycycline hyclate (VIBRA-TABS) 100 MG tablet     fluticasone (FLONASE) 50 MCG/ACT nasal spray     hydroxychloroquine (PLAQUENIL) 200 MG tablet     ibuprofen (ADVIL/MOTRIN) 600 MG tablet     ibuprofen (ADVIL/MOTRIN) 800 MG tablet     predniSONE (DELTASONE) 10 MG tablet     predniSONE (DELTASONE) 2.5 MG tablet     predniSONE (DELTASONE) 5 MG tablet     traZODone (DESYREL) 50 MG tablet     No current facility-administered medications for this visit.        Allergies   Allergies   Allergen Reactions     Erythromycin Other (See Comments)     Unknown severity.  Per mother believes he had a rash in the past.  Was not hospitalized for the reaction.     Lactose Other (See Comments)     Family History: + FH of Rheumatoid arthritis    Social History: Not currently working. Ana. Smokes cigarettes, trying to cut down. Once every 2 weeks or so will drink, binges at those times. Some MJ, otherwise no drug use.      Objective:    Physical exam:  No vitals for this video visit. Vitals reviewed in Epic.  GEN: Sitting up on couch unassisted, NAD, pleasant  HEENT: no facial rash  CV: no upper extremity dependent edema  Pulm: speaking in full sentences, rare dry cough, no audible wheezing, no use of accessory muscles  Abdomen: not distended  Skin: no acute cutaneous lesions  MSK: Very limited exam. Unable to make a closed fist bilaterally/ weak  strength. Unable to touch fingertips to palms bilaterally due to stiffness/pain.    Labs:  WBC   Date Value Ref Range Status   09/18/2013 12.5 (H) 4.0 - 11.0 10e9/L Final     WBC Count   Date Value Ref Range Status   09/16/2021 3.7 (L) 4.0 - 11.0 10e3/uL Final     Hemoglobin   Date Value Ref Range Status   09/16/2021 15.8 13.3 - 17.7 g/dL Final   09/18/2013 14.3 13.3 - 17.7 g/dL Final     Platelet Count   Date Value Ref Range Status   09/16/2021 189 150 - 450 10e3/uL Final   09/18/2013 212 150 - 450 10e9/L Final     Creatinine   Date Value Ref Range Status   09/16/2021 0.87 0.66 - 1.25 mg/dL Final   09/18/2013 1.01 0.66 - 1.25 mg/dL Final     Lab Results   Component Value Date    ALKPHOS 75 09/16/2021    ALKPHOS 67 09/18/2013     AST   Date Value Ref Range Status   09/16/2021 20 0 - 45 U/L Final   09/18/2013 21 0 - 45 U/L Final     Lab Results   Component Value Date    ALT 37 09/16/2021    ALT 28 09/18/2013     Sed Rate   Date Value Ref Range Status   04/30/2013 117 (H) 0 - 15 mm/h Final     CRP Inflammation   Date Value Ref Range  Status   05/10/2013 57.0 (H) 0.0 - 8.0 mg/L Final     UA RESULTS:  Recent Labs   Lab Test 11/14/16  1645   COLOR Yellow   APPEARANCE Cloudy   URINEGLC Negative   URINEBILI Negative   URINEKETONE Negative   SG 1.020   UBLD Negative   URINEPH 7.5*   PROTEIN Negative   UROBILINOGEN 0.2   NITRITE Negative   LEUKEST Negative   RBCU O - 2   WBCU O - 2      MUKESH Screen by EIA   Date Value Ref Range Status   05/01/2013 <1.0  Interpretation:  Negative <1.0 Final     DNA-ds   Date Value Ref Range Status   04/20/2013 <15  Interpretation:  Negative 0 - 29 IU/mL Final     SSA (RO) Antibody IgG   Date Value Ref Range Status   05/22/2013 14  Final     Comment:     Reference range: 0 to 40  Unit: AU/mL  (Note)  INTERPRETIVE INFORMATION: SSA (Ro) (ALESIA) Ab, IgG   29 AU/mL or Less ............. Negative   30 - 40 AU/mL ................ Equivocal   41 AU/mL or Greater .......... Positive  SSA (Ro) antibody is seen in 70-75% of Sjogren syndrome  cases, 30-40% of systemic lupus erythematosus (SLE) and  5-10% of progressive systemic sclerosis (PSS).  Performed by [email or web address],  [address] [place] [id number] [phone]  [email or web address], Amelia Cota MD, Lab. Director     Rheumatoid Factor   Date Value Ref Range Status   05/01/2013 266 (H) 0 - 14 IU/mL Final       Imaging:  WRIST G/E 3 VIEWS BILATERAL    4/30/2013 5:38 PM       HISTORY: Arthritis, effusion.       COMPARISON: None.       FINDINGS:  There is normal osseous alignment.  No fractures are   identified.  No erosive changes are seen. Note significant   degenerative change is identified.       Impression   IMPRESSION: Osseous structures are unremarkable. No significant   arthritic changes identified on these images.

## 2022-07-18 ENCOUNTER — TELEPHONE (OUTPATIENT)
Dept: RHEUMATOLOGY | Facility: CLINIC | Age: 52
End: 2022-07-18

## 2022-07-18 NOTE — TELEPHONE ENCOUNTER
Attempted to contact patient 07/18 to make a follow up apt with Dr. Lepe. PT needs to be seen in 12 weeks.

## 2022-07-28 ENCOUNTER — ANCILLARY PROCEDURE (OUTPATIENT)
Dept: MRI IMAGING | Facility: CLINIC | Age: 52
End: 2022-07-28
Attending: OTOLARYNGOLOGY
Payer: MEDICAID

## 2022-07-28 ENCOUNTER — TELEPHONE (OUTPATIENT)
Dept: OTOLARYNGOLOGY | Facility: CLINIC | Age: 52
End: 2022-07-28

## 2022-07-28 DIAGNOSIS — R51.9 FACIAL PAIN: ICD-10-CM

## 2022-07-28 DIAGNOSIS — F40.240 CLAUSTROPHOBIA: Primary | ICD-10-CM

## 2022-07-28 PROCEDURE — 70551 MRI BRAIN STEM W/O DYE: CPT | Mod: GC | Performed by: RADIOLOGY

## 2022-07-28 RX ORDER — DIAZEPAM 5 MG
5 TABLET ORAL ONCE
Status: CANCELLED | OUTPATIENT
Start: 2022-07-28 | End: 2022-07-28

## 2022-07-28 RX ORDER — DIAZEPAM 5 MG
TABLET ORAL
Qty: 1 TABLET | Refills: 0 | Status: SHIPPED | OUTPATIENT
Start: 2022-07-28

## 2022-07-28 RX ORDER — GADOBUTROL 604.72 MG/ML
7.5 INJECTION INTRAVENOUS ONCE
Status: ACTIVE | OUTPATIENT
Start: 2022-07-28

## 2022-07-28 NOTE — TELEPHONE ENCOUNTER
Patient was called to verify if they had arranged a ride for their appointment tonight in order to safely take the prescribed valium. Voicemail inbox was full. The option to send a SMS text to phone with call back number was utilized. Will wait for return call.

## 2022-07-28 NOTE — TELEPHONE ENCOUNTER
Patient returned call. Verified he has arranged a ride for his appointment this evening. Verified prescription should be sent to [facility] pharmacy on file. Paige Duenas signed order to be sent over. No other questions or concerns at this time.

## 2022-07-28 NOTE — LETTER
January 20, 2022      Todd Bowden  [address] [address]        To Whom It May Concern:    Todd Bowden  was seen on 1/20/2022. He may return to work 1/21/22 with no restrictions and is not contagious.          Sincerely,    Marj Tapia, CNP    [facility]     RECORDS STATUS - ALL OTHER DIAGNOSIS    low hemoglobin  RECORDS RECEIVED FROM: [facility]   DATE RECEIVED: 9/14/2022   NOTES STATUS DETAILS   OFFICE NOTE from referring provider Complete EPIC   Referral called in by patient for low hemoglobin previously seen at [facility] in [place].   DISCHARGE SUMMARY from hospital     DISCHARGE REPORT from the ER     CLINICAL TRIAL TREATMENTS TO DATE     LABS     PATHOLOGY REPORTS     ANYTHING RELATED TO DIAGNOSIS Complete Labs last updated on 8/16/2022 in [facility]   GENONOMIC TESTING     TYPE:     IMAGING (NEED IMAGES & REPORT)     CT SCANS     MRI     MAMMO     ULTRASOUND     PET

## 2022-07-28 NOTE — TELEPHONE ENCOUNTER
[facility] Call Center    Phone Message    May a detailed message be left on voicemail: yes     Reason for Call: Other: Pt is scheduled for an MRI this evening and is requesting an Rx for oral sedation be sent to his pharmacy at [id number] [address]. Please call pt to inform once Rx has been sent. Thank you.     Action Taken: Message routed to:  [facility] ENT    Travel Screening: Not Applicable

## 2022-09-12 ENCOUNTER — OFFICE VISIT (OUTPATIENT)
Dept: OTOLARYNGOLOGY | Facility: CLINIC | Age: 52
End: 2022-09-12
Payer: COMMERCIAL

## 2022-09-12 DIAGNOSIS — Z79.899 ENCOUNTER FOR LONG-TERM (CURRENT) USE OF HIGH-RISK MEDICATION: ICD-10-CM

## 2022-09-12 DIAGNOSIS — J01.01 ACUTE RECURRENT MAXILLARY SINUSITIS: ICD-10-CM

## 2022-09-12 DIAGNOSIS — M05.79 RHEUMATOID ARTHRITIS INVOLVING MULTIPLE SITES WITH POSITIVE RHEUMATOID FACTOR (H): ICD-10-CM

## 2022-09-12 DIAGNOSIS — R51.9 FACIAL PAIN: ICD-10-CM

## 2022-09-12 PROCEDURE — 99213 OFFICE O/P EST LOW 20 MIN: CPT | Performed by: OTOLARYNGOLOGY

## 2022-09-12 RX ORDER — HYDROXYCHLOROQUINE SULFATE 200 MG/1
200 TABLET, FILM COATED ORAL 2 TIMES DAILY
Qty: 360 TABLET | Refills: 0 | Status: SHIPPED | OUTPATIENT
Start: 2022-09-12

## 2022-09-12 RX ORDER — FLUTICASONE PROPIONATE 50 MCG
2 SPRAY, SUSPENSION (ML) NASAL 2 TIMES DAILY
Qty: 32 G | Refills: 11 | Status: SHIPPED | OUTPATIENT
Start: 2022-09-12

## 2022-09-12 RX ORDER — DOXYCYCLINE HYCLATE 100 MG
100 TABLET ORAL 2 TIMES DAILY
Qty: 20 TABLET | Refills: 0 | Status: SHIPPED | OUTPATIENT
Start: 2022-09-12 | End: 2022-10-14

## 2022-09-12 NOTE — PROGRESS NOTES
HISTORY OF PRESENT ILLNESS:  Todd is back to see us again today.  He has been doing somewhat better with regards to his facial pain, pressure, and discomfort.  We did not get a full MRI due to claustrophobia, but got some results, which were clear of any concerning findings.  He otherwise has met with a rheumatologist and is meeting with his therapist soon.  He needs refills for his Plaquenil.  He likes to have a prescription for doxycycline in case he gets an infection, which occasionally he does and I am happy to have him use that when he needs it and I have refilled that as well.  I have also refilled his fluticasone as he does get some intermittent allergies and nasal congestion.    PHYSICAL EXAMINATION:  On examination today, the patient is a 52-year-old gentleman in no acute distress.  Normal mood and affect, normal ability to communicate.  Alert and appropriate.  Head is normocephalic.  Cranial nerve VII is House-Brackmann I/VI bilaterally.  Breathing without difficulty or stridor.  Eyes are anicteric.  Skin of the head and neck appears normal.  The anterior nasal exam shows normal septum and inferior turbinates without polyps or purulence.    ASSESSMENT:  A 52-year-old with complex medical history with some slow improvement.    PLAN:  At this point, plan on continuing current regimen and have refilled his medications and will follow up in three months.  I have encouraged him to follow through with the therapist as well as rheumatology.

## 2022-09-12 NOTE — PATIENT INSTRUCTIONS
"You were seen in the clinic today by Dr. Flores. If you have any questions or concerns after your appointment, please call the clinic at [phone]. Press \"1\" for scheduling, press \"3\" for nurse advice.    2.   Plan to return the clinic in 3 months.       Tasha Mei LPN  [facility]  Department of Otolaryngology  [phone]   "

## 2022-09-12 NOTE — LETTER
9/12/2022       RE: Todd Bowden  [address]  [facility] [place] [address]     Dear Colleague,    Thank you for referring your patient, Todd Bowden, to the [facility] [place] [facility]. Please see a copy of my visit note below.    HISTORY OF PRESENT ILLNESS:  Todd is back to see us again today.  He has been doing somewhat better with regards to his facial pain, pressure, and discomfort.  We did not get a full MRI due to claustrophobia, but got some results, which were clear of any concerning findings.  He otherwise has met with a rheumatologist and is meeting with his therapist soon.  He needs refills for his Plaquenil.  He likes to have a prescription for doxycycline in case he gets an infection, which occasionally he does and I am happy to have him use that when he needs it and I have refilled that as well.  I have also refilled his fluticasone as he does get some intermittent allergies and nasal congestion.    PHYSICAL EXAMINATION:  On examination today, the patient is a 52-year-old gentleman in no acute distress.  Normal mood and affect, normal ability to communicate.  Alert and appropriate.  Head is normocephalic.  Cranial nerve VII is House-Brackmann I/VI bilaterally.  Breathing without difficulty or stridor.  Eyes are anicteric.  Skin of the head and neck appears normal.  The anterior nasal exam shows normal septum and inferior turbinates without polyps or purulence.    ASSESSMENT:  A 52-year-old with complex medical history with some slow improvement.    PLAN:  At this point, plan on continuing current regimen and have refilled his medications and will follow up in three months.  I have encouraged him to follow through with the therapist as well as rheumatology.          Again, thank you for allowing me to participate in the care of your patient.      Sincerely,    Karl Flores MD

## 2022-10-14 ENCOUNTER — VIRTUAL VISIT (OUTPATIENT)
Dept: RHEUMATOLOGY | Facility: CLINIC | Age: 52
End: 2022-10-14
Attending: INTERNAL MEDICINE
Payer: COMMERCIAL

## 2022-10-14 DIAGNOSIS — M05.79 RHEUMATOID ARTHRITIS INVOLVING MULTIPLE SITES WITH POSITIVE RHEUMATOID FACTOR (H): ICD-10-CM

## 2022-10-14 DIAGNOSIS — J01.01 ACUTE RECURRENT MAXILLARY SINUSITIS: Primary | ICD-10-CM

## 2022-10-14 DIAGNOSIS — M02.30 REITER'S DISEASE (H): ICD-10-CM

## 2022-10-14 DIAGNOSIS — Z79.52 LONG TERM (CURRENT) USE OF SYSTEMIC STEROIDS: ICD-10-CM

## 2022-10-14 DIAGNOSIS — Z79.1 LONG TERM (CURRENT) USE OF NON-STEROIDAL ANTI-INFLAMMATORIES (NSAID): ICD-10-CM

## 2022-10-14 DIAGNOSIS — Z79.899 HIGH RISK MEDICATION USE: ICD-10-CM

## 2022-10-14 DIAGNOSIS — R51.9 FACIAL PAIN: ICD-10-CM

## 2022-10-14 PROCEDURE — 99214 OFFICE O/P EST MOD 30 MIN: CPT | Mod: GT | Performed by: INTERNAL MEDICINE

## 2022-10-14 RX ORDER — HYDROXYCHLOROQUINE SULFATE 200 MG/1
200 TABLET, FILM COATED ORAL 2 TIMES DAILY
Qty: 360 TABLET | Refills: 1 | Status: SHIPPED | OUTPATIENT
Start: 2022-10-14 | End: 2023-04-12

## 2022-10-14 RX ORDER — PREDNISONE 5 MG/1
5 TABLET ORAL DAILY
Qty: 90 TABLET | Refills: 1 | Status: SHIPPED | OUTPATIENT
Start: 2022-10-14 | End: 2024-09-17

## 2022-10-14 RX ORDER — DOXYCYCLINE HYCLATE 100 MG
100 TABLET ORAL 2 TIMES DAILY
Qty: 20 TABLET | Refills: 0 | Status: SHIPPED | OUTPATIENT
Start: 2022-10-14

## 2022-10-14 RX ORDER — TAMSULOSIN HYDROCHLORIDE 0.4 MG/1
CAPSULE ORAL
COMMUNITY
Start: 2022-09-06

## 2022-10-14 RX ORDER — MELOXICAM 15 MG/1
15 TABLET ORAL DAILY
Qty: 90 TABLET | Refills: 1 | Status: SHIPPED | OUTPATIENT
Start: 2022-10-14

## 2022-10-14 NOTE — PROGRESS NOTES
Outpatient Rheumatology Follow-up  This visit was conducted via synchronous video visit due to the current COVID-19 crisis to reduce patient risk.  Verbal consent was obtained and is documented below.    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 In April 2022, he reports he was feeling well and so he self discontinued the HCQ shortly after his last visit with Dr Castillo in 2013. In 2013 he had a broad serologic work-up to include RF which was high titer at 266, ,   and negative/normal CCP, SSA, C3, C4, MUKESH, Cryo, smith, RNP, dsDNA. His inflammatory arthritis symptoms had been relatively quiet/ asymptomatic until his recent diagnosis with COVID in Dec 2021 after which he developed and up until his consultation with me continued to experience joint pain, AM predominant joint stiffness lasting for hours/ the whole day and while it improves with use it does not resolve during the day. Also with ongoing myalgias. Prednisone induces rapid improvement in these symptoms of joint pain/stiffness and myalgias. He was recently on 20mg daily of prednisone with good relief, however had significant AMS with this and associated ?psychosis/?PTSD and so dose decreased down to 10mg daily with improvement in these side effects though slight worsening of his joint/muscle symptoms. His symptoms are consistent with symmetric small/medium/large joint inflammatory arthritis. Given the temporal relationship to onset of his symptoms to his recent COVID infection, I suspect his  symptoms are secondary to a post viral inflammatory arthritis vs high titer seropositive rheumatoid arthritis ( RF of 266 when checked in the past). At the time of his initial consultation, I ordered ESR, CRP, CBC, CMP, CCP, ALESIA panel which he did not have drawn. We discussed the use of methotrexate for inflammatory arthritis, though his ETOH use makes this not a safe option. We instead proceeded with slow steroid taper from 10mg down to 5mg daily, which he continues on today, along with starting HCQ 200mg BID. He returns today for follow-up.    Post viral inflammatory arthritis vs high titer RF seropositive RA: He continues to have joint pain/stiffness which comes on mostly around weather changes to suggest at least some component of non inflammatory OA and associated pain. He has been taking prednisone 5mg daily and HCQ 200mg BID without noticeable side effects. Overall symptoms are stable without acute interval flares of any red/hot/swollen joints or significant pain/stiffness that was present at the time of his initial consultation with me. Will plan to continue with current treatment  -continue prednisone 5mg daily  -continue HCQ 200mg BID  -Continue meloxicam 15mg once daily prn. Discussed again today that he cannot take any other NSAIDs in the 24 hours after each dose.   -Counseled that he needs to get labs done which were ordered at the time of his last visit.     High risk medication use: HCQ  ---Risks and benefits of HCQ discussed today to include rash (including severe rash/SJS/TEN), HA, GI upset, hepatoxicity, retinal toxicity, need for yearly screening OCT exam, need for CBC, CMP, ESR, CRP for routine screening drug toxicity labs among others. Patient is agreeable to continue.   --He had his ophthalmology initial consultation for baseline screening OCT exam on 4/28/22, due again April 2023   --Standing q6 month CBC, CMP, ESR, CRP ordered for routine drug toxicity screening labs. He is due for these now  so will assist with scheduling. I was able to review his CBC with diff and BMP from care everywhere on 9/2/22 which did not show evidence of drug toxicity. No LFTs to review from that lab draw.    Long term current systemic steroid use: prednisone 5mg daily  -Will again discuss slow taper at next visit, though will continue at 5mg daily for now given intermittent ongoing symptoms    Long term current NSAID use: Mobic  - no side effects  -Reviewed his BMP, CBC, and UA from 9/2/22 without signs of toxicity. Will continue on same lab monitoring schedule as HCQ- q6 months    Bacterial sinusitis: Has had return of symptoms consistent with prior bacterial sinus infections. Previously treated with Doxycycline 100mg BID for 10 days. With his current symptoms consistent with his prior infections, have refilled.    Ben Lepe MD  Rheumatology       Subjective:   Interval History 10/14/22  Mr Harman presents today for routine scheduled follow-up. He was last seen 3 months ago. At that time he was continued on HCQ 200mg BID and prednisone 5mg daily. Mobic 15mg once daily was added to his regimen for non inflammatory OA joint pains that have continued and are most troublesome with rapid weather changes. Otherwise, he denies any new/progressive/persistent red/hot/swollen joints. She is tolerating his medications without any noticeable side effects, GI/rash or other. No vision changes. He still has AM stiffness and stiffness in joints after prolonged periods of rest, though much improved from the time of our initial consultation. Over the last few days reports return of symptoms consistent with prior sinus infections which were successfully/rapidly improved with doxycycline. Has been having sinus pain, pressure and drainage. No new/progressive fevers/chills/night sweats. No other infections other than potential sinus symptoms above. No new rash. No unintentional weight loss. 14 point review of systems collected and negative  if not documented above.    Interval History 7/15/22  Was weaning off of prednisone, got down to 2.5mg daily which was when he noticed pain returned with bad weather. So as previously discussed, he increased the dose back up to the previously effective dose of 5mg daily and he continues on that dose today. Depending on time of day/when he takes his prednisone form waking. Still with paresthesias in LE when sitting on the toilet. Has periods of knees giving out on him and his legs give out- predominantly ankle/foot on the left side. Intermittently will have some swelling on that side. Taking HCQ 200mg BID without noticeable side effects. Switched to doxycyline from amox/clav. Had lots of diarrhea from this. 2 weeks ago had sick contacts (one child with COVID), he was having some mild SOB, though now resolved. Checked COVID test and negative. Joints very affected by humidity. 14 point review of systems collected and negative if not documented above.    HPI from initial consultation April 2022  Had COVID Dec 22nd. Since then has been positive since late Feb. Had body aches started with that. Severe. With meningitis dx, had calcium deposits in his mouth. Painful. Had loss of finger/toe nails. Has continued to have joint pain since discharge at that time. Has had recurrent sinus infections. With joint pains with weather changes. Has been taking NSAIDs/tylenol when needed. Experiences joint cracking/clicking/catching with joint movement. When he had COVID, he was started on prednisone and tapered to lower dose of current dose of 10mg daily. Reports pain is always there. Reports that his depression, aches while washing dishes, sitting on the toilet his leg goes to sleep, when he stands from the toilet he has pain in his knees, has numbess, also twitches. Started on 20mg prednisone by Dr Gann, which he thinks was helpful for joints. He then discontinued this due to side effects, then a few weeks ago pain was worse and so  restarted on 10mg daily. Has had ongoing pain and stiffness, AM predominant. He has found that the prednisone has been very helpful in terms of allowing him to climb stairs/wash dishes/do laundry. AM is worst for his stiffness. No rashes, intermittent axillary LAD. Has continued to have fevers/chills, recent infection from son. Had kidney stone last year, has been multiple years since last STD. Off and on diarrhea, chronic. Doxycycline causes diarrhea. No changes in vision or hearing. Lost weight during covid, though has gained this back. Started ABX for 21 days, started on Monday. No blood in stool. No change in urinary symptoms. No hemoptysis or epistaxis. No symptoms consistent with raynauds. No rash consistent with psoriasis. No history of inflammatory eye diseases. No symptoms consistent with inflammatory back pain. No history consistent with dactylitis. He previously tolerated HCQ without side effects, discontinued due to feeling well. 14 point ROS collected and negative if not documented above.     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     Sepsis, unspecified 4/26/2013     Sickle cell anemia (H)     screening performed at [facility] were negative 04/2013     Skin desquamation 5/28/2013     Stress-induced cardiomyopathy 5/10/2013       Past Surgical History reviewed     Medications  Current Outpatient Medications   Medication     azelastine (ASTELIN) 0.1 % nasal spray     diazepam (VALIUM) 5 MG tablet     diphenhydrAMINE (BENADRYL) 25 MG tablet     doxycycline hyclate (VIBRA-TABS) 100 MG tablet     fluticasone (FLONASE) 50 MCG/ACT nasal spray     hydroxychloroquine (PLAQUENIL) 200 MG tablet     ibuprofen (ADVIL/MOTRIN) 600 MG tablet      ibuprofen (ADVIL/MOTRIN) 800 MG tablet     meloxicam (MOBIC) 15 MG tablet     predniSONE (DELTASONE) 10 MG tablet     predniSONE (DELTASONE) 5 MG tablet     traZODone (DESYREL) 50 MG tablet     No current facility-administered medications for this visit.     Facility-Administered Medications Ordered in Other Visits   Medication     gadobutrol (GADAVIST) injection 7.5 mL       Allergies   Allergies   Allergen Reactions     Erythromycin Other (See Comments)     Unknown severity.  Per mother believes he had a rash in the past.  Was not hospitalized for the reaction.     Lactose Other (See Comments)     Family History: + FH of Rheumatoid arthritis    Social History: Not currently working. Ana. Smokes cigarettes, trying to cut down. Once every 2 weeks or so will drink, binges at those times. Some MJ, otherwise no drug use.      Objective:    Physical exam:  No vitals for this video visit. Vitals reviewed in Epic.  GEN: laying on couch, NAD, pleasant and interactive as always  HEENT: no facial rash  CV: no upper extremity dependent edema  Pulm: speaking in full sentences, rare cough that is not completely dry, no audible wheezing, no use of accessory muscles  Skin: no acute cutaneous lesions  MSK: Limited by video though no obvious edema or loss of valleys of MCPs bilaterally. He is able to clench his fists, though  strength does not appear tight.     Labs:  WBC   Date Value Ref Range Status   09/18/2013 12.5 (H) 4.0 - 11.0 10e9/L Final     WBC Count   Date Value Ref Range Status   09/16/2021 3.7 (L) 4.0 - 11.0 10e3/uL Final     Hemoglobin   Date Value Ref Range Status   09/16/2021 15.8 13.3 - 17.7 g/dL Final   09/18/2013 14.3 13.3 - 17.7 g/dL Final     Platelet Count   Date Value Ref Range Status   09/16/2021 189 150 - 450 10e3/uL Final   09/18/2013 212 150 - 450 10e9/L Final     Creatinine   Date Value Ref Range Status   09/16/2021 0.87 0.66 - 1.25 mg/dL Final   09/18/2013 1.01 0.66 - 1.25 mg/dL Final     Lab  Results   Component Value Date    ALKPHOS 75 09/16/2021    ALKPHOS 67 09/18/2013     AST   Date Value Ref Range Status   09/16/2021 20 0 - 45 U/L Final   09/18/2013 21 0 - 45 U/L Final     Lab Results   Component Value Date    ALT 37 09/16/2021    ALT 28 09/18/2013     Sed Rate   Date Value Ref Range Status   04/30/2013 117 (H) 0 - 15 mm/h Final     CRP Inflammation   Date Value Ref Range Status   05/10/2013 57.0 (H) 0.0 - 8.0 mg/L Final     UA RESULTS:  Recent Labs   Lab Test 11/14/16  1645   COLOR Yellow   APPEARANCE Cloudy   URINEGLC Negative   URINEBILI Negative   URINEKETONE Negative   SG 1.020   UBLD Negative   URINEPH 7.5*   PROTEIN Negative   UROBILINOGEN 0.2   NITRITE Negative   LEUKEST Negative   RBCU O - 2   WBCU O - 2      MUKESH Screen by EIA   Date Value Ref Range Status   05/01/2013 <1.0  Interpretation:  Negative <1.0 Final     DNA-ds   Date Value Ref Range Status   04/20/2013 <15  Interpretation:  Negative 0 - 29 IU/mL Final     SSA (RO) Antibody IgG   Date Value Ref Range Status   05/22/2013 14  Final     Comment:     Reference range: 0 to 40  Unit: AU/mL  (Note)  INTERPRETIVE INFORMATION: SSA (Ro) (ALESIA) Ab, IgG   29 AU/mL or Less ............. Negative   30 - 40 AU/mL ................ Equivocal   41 AU/mL or Greater .......... Positive  SSA (Ro) antibody is seen in 70-75% of Sjogren syndrome  cases, 30-40% of systemic lupus erythematosus (SLE) and  5-10% of progressive systemic sclerosis (PSS).  Performed by [facility],  [address] [place] [id number] [phone]  [email or web address], Amelia Cota MD, Lab. Director     Rheumatoid Factor   Date Value Ref Range Status   05/01/2013 266 (H) 0 - 14 IU/mL Final       Imaging:  WRIST G/E 3 VIEWS BILATERAL    4/30/2013 5:38 PM       HISTORY: Arthritis, effusion.       COMPARISON: None.       FINDINGS:  There is normal osseous alignment.  No fractures are   identified.  No erosive changes are seen. Note significant   degenerative change is  identified.       Impression   IMPRESSION: Osseous structures are unremarkable. No significant   arthritic changes identified on these images.

## 2022-10-14 NOTE — LETTER
10/14/2022       RE: Todd Bowden  [address]  [facility] [address]     Dear Colleague,    Thank you for referring your patient, Todd Bowden, to the [facility] [place] [facility]. Please see a copy of my visit note below.      Outpatient Rheumatology Follow-up  This visit was conducted via synchronous video visit due to the current COVID-19 crisis to reduce patient risk.  Verbal consent was obtained and is documented below.    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 In April 2022, he reports he was feeling well and so he self discontinued the HCQ shortly after his last visit with Dr Castillo in 2013. In 2013 he had a broad serologic work-up to include RF which was high titer at 266, ,   and negative/normal CCP, SSA, C3, C4, MUKESH, Cryo, smith, RNP, dsDNA. His inflammatory arthritis symptoms had been relatively quiet/ asymptomatic until his recent diagnosis with COVID in Dec 2021 after which he developed and up until his consultation with me continued to experience joint pain, AM predominant joint stiffness lasting for hours/ the whole day and while it improves with use it does not resolve during the day. Also with ongoing myalgias. Prednisone induces rapid improvement in these symptoms of joint pain/stiffness and myalgias. He was recently on 20mg daily of prednisone with good relief, however had significant AMS with this and associated ?psychosis/?PTSD and so  dose decreased down to 10mg daily with improvement in these side effects though slight worsening of his joint/muscle symptoms. His symptoms are consistent with symmetric small/medium/large joint inflammatory arthritis. Given the temporal relationship to onset of his symptoms to his recent COVID infection, I suspect his symptoms are secondary to a post viral inflammatory arthritis vs high titer seropositive rheumatoid arthritis ( RF of 266 when checked in the past). At the time of his initial consultation, I ordered ESR, CRP, CBC, CMP, CCP, ALESIA panel which he did not have drawn. We discussed the use of methotrexate for inflammatory arthritis, though his ETOH use makes this not a safe option. We instead proceeded with slow steroid taper from 10mg down to 5mg daily, which he continues on today, along with starting HCQ 200mg BID. He returns today for follow-up.    Post viral inflammatory arthritis vs high titer RF seropositive RA: He continues to have joint pain/stiffness which comes on mostly around weather changes to suggest at least some component of non inflammatory OA and associated pain. He has been taking prednisone 5mg daily and HCQ 200mg BID without noticeable side effects. Overall symptoms are stable without acute interval flares of any red/hot/swollen joints or significant pain/stiffness that was present at the time of his initial consultation with me. Will plan to continue with current treatment  -continue prednisone 5mg daily  -continue HCQ 200mg BID  -Continue meloxicam 15mg once daily prn. Discussed again today that he cannot take any other NSAIDs in the 24 hours after each dose.   -Counseled that he needs to get labs done which were ordered at the time of his last visit.     High risk medication use: HCQ  ---Risks and benefits of HCQ discussed today to include rash (including severe rash/SJS/TEN), HA, GI upset, hepatoxicity, retinal toxicity, need for yearly screening OCT exam, need for CBC, CMP, ESR,  CRP for routine screening drug toxicity labs among others. Patient is agreeable to continue.   --He had his ophthalmology initial consultation for baseline screening OCT exam on 4/28/22, due again April 2023   --Standing q6 month CBC, CMP, ESR, CRP ordered for routine drug toxicity screening labs. He is due for these now so will assist with scheduling. I was able to review his CBC with diff and BMP from care everywhere on 9/2/22 which did not show evidence of drug toxicity. No LFTs to review from that lab draw.    Long term current systemic steroid use: prednisone 5mg daily  -Will again discuss slow taper at next visit, though will continue at 5mg daily for now given intermittent ongoing symptoms    Long term current NSAID use: Mobic  - no side effects  -Reviewed his BMP, CBC, and UA from 9/2/22 without signs of toxicity. Will continue on same lab monitoring schedule as HCQ- q6 months    Bacterial sinusitis: Has had return of symptoms consistent with prior bacterial sinus infections. Previously treated with Doxycycline 100mg BID for 10 days. With his current symptoms consistent with his prior infections, have refilled.    Ben Lepe MD  Rheumatology       Subjective:   Interval History 10/14/22  Mr Harman presents today for routine scheduled follow-up. He was last seen 3 months ago. At that time he was continued on HCQ 200mg BID and prednisone 5mg daily. Mobic 15mg once daily was added to his regimen for non inflammatory OA joint pains that have continued and are most troublesome with rapid weather changes. Otherwise, he denies any new/progressive/persistent red/hot/swollen joints. She is tolerating his medications without any noticeable side effects, GI/rash or other. No vision changes. He still has AM stiffness and stiffness in joints after prolonged periods of rest, though much improved from the time of our initial consultation. Over the last few days reports return of symptoms consistent with prior sinus  infections which were successfully/rapidly improved with doxycycline. Has been having sinus pain, pressure and drainage. No new/progressive fevers/chills/night sweats. No other infections other than potential sinus symptoms above. No new rash. No unintentional weight loss. 14 point review of systems collected and negative if not documented above.    Interval History 7/15/22  Was weaning off of prednisone, got down to 2.5mg daily which was when he noticed pain returned with bad weather. So as previously discussed, he increased the dose back up to the previously effective dose of 5mg daily and he continues on that dose today. Depending on time of day/when he takes his prednisone form waking. Still with paresthesias in LE when sitting on the toilet. Has periods of knees giving out on him and his legs give out- predominantly ankle/foot on the left side. Intermittently will have some swelling on that side. Taking HCQ 200mg BID without noticeable side effects. Switched to doxycyline from amox/clav. Had lots of diarrhea from this. 2 weeks ago had sick contacts (one child with COVID), he was having some mild SOB, though now resolved. Checked COVID test and negative. Joints very affected by humidity. 14 point review of systems collected and negative if not documented above.    HPI from initial consultation April 2022  Had COVID Dec 22nd. Since then has been positive since late Feb. Had body aches started with that. Severe. With meningitis dx, had calcium deposits in his mouth. Painful. Had loss of finger/toe nails. Has continued to have joint pain since discharge at that time. Has had recurrent sinus infections. With joint pains with weather changes. Has been taking NSAIDs/tylenol when needed. Experiences joint cracking/clicking/catching with joint movement. When he had COVID, he was started on prednisone and tapered to lower dose of current dose of 10mg daily. Reports pain is always there. Reports that his depression, aches  while washing dishes, sitting on the toilet his leg goes to sleep, when he stands from the toilet he has pain in his knees, has numbess, also twitches. Started on 20mg prednisone by Dr Gann, which he thinks was helpful for joints. He then discontinued this due to side effects, then a few weeks ago pain was worse and so restarted on 10mg daily. Has had ongoing pain and stiffness, AM predominant. He has found that the prednisone has been very helpful in terms of allowing him to climb stairs/wash dishes/do laundry. AM is worst for his stiffness. No rashes, intermittent axillary LAD. Has continued to have fevers/chills, recent infection from son. Had kidney stone last year, has been multiple years since last STD. Off and on diarrhea, chronic. Doxycycline causes diarrhea. No changes in vision or hearing. Lost weight during covid, though has gained this back. Started ABX for 21 days, started on Monday. No blood in stool. No change in urinary symptoms. No hemoptysis or epistaxis. No symptoms consistent with raynauds. No rash consistent with psoriasis. No history of inflammatory eye diseases. No symptoms consistent with inflammatory back pain. No history consistent with dactylitis. He previously tolerated HCQ without side effects, discontinued due to feeling well. 14 point ROS collected and negative if not documented above.     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     Sepsis, unspecified 4/26/2013     Sickle cell anemia (H)     screening performed at [facility] were negative 04/2013     Skin desquamation 5/28/2013     Stress-induced cardiomyopathy 5/10/2013       Past Surgical History reviewed     Medications  Current Outpatient Medications    Medication     azelastine (ASTELIN) 0.1 % nasal spray     diazepam (VALIUM) 5 MG tablet     diphenhydrAMINE (BENADRYL) 25 MG tablet     doxycycline hyclate (VIBRA-TABS) 100 MG tablet     fluticasone (FLONASE) 50 MCG/ACT nasal spray     hydroxychloroquine (PLAQUENIL) 200 MG tablet     ibuprofen (ADVIL/MOTRIN) 600 MG tablet     ibuprofen (ADVIL/MOTRIN) 800 MG tablet     meloxicam (MOBIC) 15 MG tablet     predniSONE (DELTASONE) 10 MG tablet     predniSONE (DELTASONE) 5 MG tablet     traZODone (DESYREL) 50 MG tablet     No current facility-administered medications for this visit.     Facility-Administered Medications Ordered in Other Visits   Medication     gadobutrol (GADAVIST) injection 7.5 mL       Allergies   Allergies   Allergen Reactions     Erythromycin Other (See Comments)     Unknown severity.  Per mother believes he had a rash in the past.  Was not hospitalized for the reaction.     Lactose Other (See Comments)     Family History: + FH of Rheumatoid arthritis    Social History: Not currently working. Ana. Smokes cigarettes, trying to cut down. Once every 2 weeks or so will drink, binges at those times. Some MJ, otherwise no drug use.      Objective:    Physical exam:  No vitals for this video visit. Vitals reviewed in Epic.  GEN: laying on couch, NAD, pleasant and interactive as always  HEENT: no facial rash  CV: no upper extremity dependent edema  Pulm: speaking in full sentences, rare cough that is not completely dry, no audible wheezing, no use of accessory muscles  Skin: no acute cutaneous lesions  MSK: Limited by video though no obvious edema or loss of valleys of MCPs bilaterally. He is able to clench his fists, though  strength does not appear tight.     Labs:  WBC   Date Value Ref Range Status   09/18/2013 12.5 (H) 4.0 - 11.0 10e9/L Final     WBC Count   Date Value Ref Range Status   09/16/2021 3.7 (L) 4.0 - 11.0 10e3/uL Final     Hemoglobin   Date Value Ref Range Status   09/16/2021 15.8  13.3 - 17.7 g/dL Final   09/18/2013 14.3 13.3 - 17.7 g/dL Final     Platelet Count   Date Value Ref Range Status   09/16/2021 189 150 - 450 10e3/uL Final   09/18/2013 212 150 - 450 10e9/L Final     Creatinine   Date Value Ref Range Status   09/16/2021 0.87 0.66 - 1.25 mg/dL Final   09/18/2013 1.01 0.66 - 1.25 mg/dL Final     Lab Results   Component Value Date    ALKPHOS 75 09/16/2021    ALKPHOS 67 09/18/2013     AST   Date Value Ref Range Status   09/16/2021 20 0 - 45 U/L Final   09/18/2013 21 0 - 45 U/L Final     Lab Results   Component Value Date    ALT 37 09/16/2021    ALT 28 09/18/2013     Sed Rate   Date Value Ref Range Status   04/30/2013 117 (H) 0 - 15 mm/h Final     CRP Inflammation   Date Value Ref Range Status   05/10/2013 57.0 (H) 0.0 - 8.0 mg/L Final     UA RESULTS:  Recent Labs   Lab Test 11/14/16  1645   COLOR Yellow   APPEARANCE Cloudy   URINEGLC Negative   URINEBILI Negative   URINEKETONE Negative   SG 1.020   UBLD Negative   URINEPH 7.5*   PROTEIN Negative   UROBILINOGEN 0.2   NITRITE Negative   LEUKEST Negative   RBCU O - 2   WBCU O - 2      MUKESH Screen by EIA   Date Value Ref Range Status   05/01/2013 <1.0  Interpretation:  Negative <1.0 Final     DNA-ds   Date Value Ref Range Status   04/20/2013 <15  Interpretation:  Negative 0 - 29 IU/mL Final     SSA (RO) Antibody IgG   Date Value Ref Range Status   05/22/2013 14  Final     Comment:     Reference range: 0 to 40  Unit: AU/mL  (Note)  INTERPRETIVE INFORMATION: SSA (Ro) (ALESIA) Ab, IgG   29 AU/mL or Less ............. Negative   30 - 40 AU/mL ................ Equivocal   41 AU/mL or Greater .......... Positive  SSA (Ro) antibody is seen in 70-75% of Sjogren syndrome  cases, 30-40% of systemic lupus erythematosus (SLE) and  5-10% of progressive systemic sclerosis (PSS).  Performed by [facility],  [address], [place] [address] [phone]  [email or web address], Amelia Cota MD, Lab. Director     Rheumatoid Factor   Date Value Ref Range Status    05/01/2013 266 (H) 0 - 14 IU/mL Final       Imaging:  WRIST G/E 3 VIEWS BILATERAL    4/30/2013 5:38 PM       HISTORY: Arthritis, effusion.       COMPARISON: None.       FINDINGS:  There is normal osseous alignment.  No fractures are   identified.  No erosive changes are seen. Note significant   degenerative change is identified.       Impression   IMPRESSION: Osseous structures are unremarkable. No significant   arthritic changes identified on these images.                   Todd Bowden  is being evaluated via a billable video visit.      Patient declined individual allergy and medication review by support staff because he just woke up and did not want to go through them, stated everything remains the same, then PT reported taking tamsulosin.     How would you like to obtain your AVS? [employer]  For the video visit, send the invitation by: Text to cell phone: [phone]  Will anyone else be joining your video visit? No      Video start time: 10:45AM  Video end time: 11:08 AM  Patient location: home  Physician location: off site  Shawn Lepe MD  Rheumatology

## 2022-10-14 NOTE — PROGRESS NOTES
Todd Bowden  is being evaluated via a billable video visit.      Patient declined individual allergy and medication review by support staff because he just woke up and did not want to go through them, stated everything remains the same, then PT reported taking tamsulosin.     How would you like to obtain your AVS? MyChart  For the video visit, send the invitation by: Text to cell phone: [phone]  Will anyone else be joining your video visit? No      Video start time: 10:45AM  Video end time: 11:08 AM  Patient location: home  Physician location: off site  Shawn Lepe MD  Rheumatology

## 2022-10-17 ENCOUNTER — TELEPHONE (OUTPATIENT)
Dept: RHEUMATOLOGY | Facility: CLINIC | Age: 52
End: 2022-10-17

## 2022-10-17 NOTE — TELEPHONE ENCOUNTER
Provider: Ben Lepe MD Department:  ADULT RHEUMATOLOGY     Visit Disposition    Dispositions   0 Return in about 4 months (around 2/14/2023) for Video or In Person- per patient preference.     Writer tried to contact patient to schedule follow up per provider check out notes above, but was unable to reach the patient. Writer left a generic message for patient to call back. If patient calls back, please assist with scheduling follow up per provider notes.    Mayra RUSH Virtual Visit Facilitator 9:15 AM October 17, 2022

## 2022-12-12 ENCOUNTER — OFFICE VISIT (OUTPATIENT)
Dept: OTOLARYNGOLOGY | Facility: CLINIC | Age: 52
End: 2022-12-12
Payer: COMMERCIAL

## 2022-12-12 VITALS
RESPIRATION RATE: 16 BRPM | WEIGHT: 173 LBS | SYSTOLIC BLOOD PRESSURE: 104 MMHG | OXYGEN SATURATION: 97 % | BODY MASS INDEX: 24.82 KG/M2 | DIASTOLIC BLOOD PRESSURE: 61 MMHG | TEMPERATURE: 98.9 F | HEART RATE: 84 BPM

## 2022-12-12 DIAGNOSIS — M05.79 RHEUMATOID ARTHRITIS INVOLVING MULTIPLE SITES WITH POSITIVE RHEUMATOID FACTOR (H): Primary | ICD-10-CM

## 2022-12-12 PROCEDURE — 99213 OFFICE O/P EST LOW 20 MIN: CPT | Performed by: OTOLARYNGOLOGY

## 2022-12-12 RX ORDER — DOXYCYCLINE HYCLATE 100 MG
100 TABLET ORAL 2 TIMES DAILY
Qty: 20 TABLET | Refills: 0 | Status: SHIPPED | OUTPATIENT
Start: 2022-12-12 | End: 2022-12-22

## 2022-12-12 RX ORDER — PREDNISONE 5 MG/1
5 TABLET ORAL DAILY
Qty: 90 TABLET | Refills: 1 | Status: SHIPPED | OUTPATIENT
Start: 2022-12-12 | End: 2023-03-12

## 2022-12-12 RX ORDER — MUPIROCIN 20 MG/G
OINTMENT TOPICAL
Qty: 30 G | Refills: 0 | Status: SHIPPED | OUTPATIENT
Start: 2022-12-12 | End: 2022-12-22

## 2022-12-12 ASSESSMENT — PAIN SCALES - GENERAL: PAINLEVEL: SEVERE PAIN (7)

## 2022-12-12 NOTE — PATIENT INSTRUCTIONS
You were seen in the ENT Clinic today by Dr. Flores. If you have any questions or concerns after your appointment, please contact us (see below)      2.   Please return to clinic in 3 months.         How to Contact Us:  Send a Somewhere message to your provider. Our team will respond to you via Somewhere. Occasionally, we will need to call you to get further information.  For urgent matters (Monday-Friday), call the ENT Clinic: [phone] and speak with a call center team member - they will route your call appropriately.   If you'd like to speak directly with a nurse, please find our contact information below. We do our best to check voicemail frequently throughout the day, and will work to call you back within 1-2 days. For urgent matters, please use the general clinic phone numbers listed above.      Raysa DIAS RN  ENT RN Care Coordinator  Direct: [phone]  Tasha ADAME LPN  Direct: [phone]

## 2022-12-12 NOTE — LETTER
12/12/2022       RE: Todd Bowden  [address]  [facility] [address]     Dear Colleague,    Thank you for referring your patient, Todd Bowden, to the [facility] [place] at [facility]. Please see a copy of my visit note below.    HISTORY OF PRESENT ILLNESS:  Todd is back to see us today.  He is continuing to have issues of pain and intermittently does get infections.  He feels that taking the doxycycline and using the Bactroban when he has symptoms is helpful.  He also needs a refill on his chronic prednisone use.    PHYSICAL EXAMINATION:  On examination today, he is in no distress, alert and interactive.  Breathing without difficulty or stridor.    ASSESSMENT:  A 52-year-old with complex medical problems and some persistent discomfort.  No evidence of infection currently.     PLAN:  We will prescribe him his antibiotics in case those symptoms become worse and refilled his prednisone.  Follow up in a couple of months.    A total of 20 minutes spent with the patient, on chart review and on the date of the visit.          Again, thank you for allowing me to participate in the care of your patient.      Sincerely,    Karl Flores MD

## 2023-01-09 NOTE — PROGRESS NOTES
HISTORY OF PRESENT ILLNESS:  Todd is back to see us today.  He is continuing to have issues of pain and intermittently does get infections.  He feels that taking the doxycycline and using the Bactroban when he has symptoms is helpful.  He also needs a refill on his chronic prednisone use.    PHYSICAL EXAMINATION:  On examination today, he is in no distress, alert and interactive.  Breathing without difficulty or stridor.    ASSESSMENT:  A 52-year-old with complex medical problems and some persistent discomfort.  No evidence of infection currently.     PLAN:  We will prescribe him his antibiotics in case those symptoms become worse and refilled his prednisone.  Follow up in a couple of months.    A total of 20 minutes spent with the patient, on chart review and on the date of the visit.

## 2023-03-13 ENCOUNTER — TELEPHONE (OUTPATIENT)
Dept: OTOLARYNGOLOGY | Facility: CLINIC | Age: 53
End: 2023-03-13

## 2023-03-13 NOTE — TELEPHONE ENCOUNTER
[facility] Call Center    Phone Message    May a detailed message be left on voicemail: yes     Reason for Call: Other: Pt missed his appt today due to Covid. He is wondering what he can do in the meantime for symptom control, he is having a hard time breathing. Please review and call pt back .     Action Taken: Message routed to:  [facility] ENT    Travel Screening: Not Applicable

## 2023-03-13 NOTE — TELEPHONE ENCOUNTER
This writer returned call to patient regarding his current symptoms.    Clarified with patient what was meant by having a hard time breathing. Patient clarified for this writer that he is unable to breathe out of his nose due to the constant mucus he is producing. Patient denied shortness of breath with his lung function.    Reviewed with patient that we will allow him to get to feeling better and then we will call him in a few weeks to get him rescheduled for his appointment with Dr. Flores.    Patient is in agreement with this plan. Patient is in touch with his primary care provider regarding symptom management.    Nothing further needed at this time.    LEXUS MARTINEZ, GLORIA on 3/13/2023 at 4:11 PM

## 2023-04-07 ENCOUNTER — TELEPHONE (OUTPATIENT)
Dept: OTOLARYNGOLOGY | Facility: CLINIC | Age: 53
End: 2023-04-07
Payer: COMMERCIAL

## 2023-04-07 NOTE — TELEPHONE ENCOUNTER
This writer returned patient's call from message received from our scheduling staff. Patient had been called to schedule a follow up appointment with Dr. Flores after patient's last appointment needed to be cancelled due to COVID.    Patient stated to this writer he had a second bout of COVID after the first. Patient notes that he is still not feeling well and is requesting an antibiotic and prednisone from Dr. Flores.    This writer advised patient that  would be returning to clinic on Monday and that we would talk to him then about whether he would be willing to prescribe additional medication without seeing him. Patient is scheduled for follow up with provider on May 1st. Advised patient that if he worsens over the weekend he should be evaluated in urgent care.    Patient verbalizes understanding of this plan and is in agreement. Patient has no further questions at this time.    LEXUS MARTINEZ LPN on 4/7/2023 at 1:04 PM

## 2023-07-31 DIAGNOSIS — M05.79 RHEUMATOID ARTHRITIS INVOLVING MULTIPLE SITES WITH POSITIVE RHEUMATOID FACTOR (H): ICD-10-CM

## 2023-08-03 RX ORDER — MELOXICAM 15 MG/1
15 TABLET ORAL DAILY
Qty: 90 TABLET | OUTPATIENT
Start: 2023-08-03

## 2023-08-03 NOTE — TELEPHONE ENCOUNTER
meloxicam (MOBIC) 15 MG tablet      Last Written Prescription Date:  10/14/22  Last Fill Quantity: 90,   # refills: 1  Last Office Visit : 10/14/22  Future Office visit:  none    Routing refill request to provider for review/approval because:  Overdue for labs: CBC, Cr, ALT, AST

## 2023-08-03 NOTE — TELEPHONE ENCOUNTER
Refill request declined, patient overdue for labs and return appointment.  Kanchan Berg RN  Adult Rheumatology Clinic

## 2024-09-13 ENCOUNTER — OFFICE VISIT (OUTPATIENT)
Dept: URGENT CARE | Facility: URGENT CARE | Age: 54
End: 2024-09-13

## 2024-09-13 VITALS
OXYGEN SATURATION: 93 % | WEIGHT: 170 LBS | BODY MASS INDEX: 24.39 KG/M2 | TEMPERATURE: 97.7 F | DIASTOLIC BLOOD PRESSURE: 81 MMHG | SYSTOLIC BLOOD PRESSURE: 121 MMHG | HEART RATE: 96 BPM

## 2024-09-13 DIAGNOSIS — M79.89 LEG SWELLING: Primary | ICD-10-CM

## 2024-09-13 PROCEDURE — 99213 OFFICE O/P EST LOW 20 MIN: CPT | Performed by: FAMILY MEDICINE

## 2024-09-13 RX ORDER — MULTIVITAMIN,THERAPEUTIC
1 TABLET ORAL DAILY
COMMUNITY

## 2024-09-13 RX ORDER — ASCORBIC ACID 100 MG
TABLET,CHEWABLE ORAL
COMMUNITY

## 2024-09-14 NOTE — PROGRESS NOTES
SUBJECTIVE: Todd Bowden is a 54 year old male presenting with a chief complaint of leg swelling.  Onset of symptoms was day(s) ago.    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 were negative 04/2013    Skin desquamation 5/28/2013    Stress-induced cardiomyopathy 5/10/2013     Allergies   Allergen Reactions    Erythromycin Other (See Comments)     Unknown severity.  Per mother believes he had a rash in the past.  Was not hospitalized for the reaction.    Lactose Other (See Comments)     Social History     Tobacco Use    Smoking status: Every Day     Current packs/day: 0.50     Average packs/day: 0.5 packs/day for 20.0 years (10.0 ttl pk-yrs)     Types: Cigarettes    Smokeless tobacco: Never    Tobacco comments:     on patches   Substance Use Topics    Alcohol use: No       ROS:  SKIN: no rash  GI: no vomiting    OBJECTIVE:  /81   Pulse 96   Temp 97.7  F (36.5  C) (Tympanic)   Wt 77.1 kg (170 lb)   SpO2 93%   BMI 24.39 kg/m  GENERAL APPEARANCE: healthy, alert and no distress  SKIN: no suspicious lesions or rashes  Lower ext edema calf pain      ICD-10-CM    1. Leg swelling  M79.89         Pt will go through ED for w/u

## 2024-09-16 ENCOUNTER — HOSPITAL ENCOUNTER (EMERGENCY)
Facility: CLINIC | Age: 54
Discharge: HOME | End: 2024-09-17
Attending: EMERGENCY MEDICINE | Admitting: EMERGENCY MEDICINE

## 2024-09-16 DIAGNOSIS — M79.672 PAIN IN BOTH FEET: ICD-10-CM

## 2024-09-16 DIAGNOSIS — R00.2 PALPITATIONS: ICD-10-CM

## 2024-09-16 DIAGNOSIS — M79.671 PAIN IN BOTH FEET: ICD-10-CM

## 2024-09-16 DIAGNOSIS — E87.6 HYPOKALEMIA: ICD-10-CM

## 2024-09-16 PROCEDURE — 99285 EMERGENCY DEPT VISIT HI MDM: CPT

## 2024-09-16 PROCEDURE — 250N000013 HC RX MED GY IP 250 OP 250 PS 637: Performed by: STUDENT IN AN ORGANIZED HEALTH CARE EDUCATION/TRAINING PROGRAM

## 2024-09-16 PROCEDURE — 93005 ELECTROCARDIOGRAM TRACING: CPT

## 2024-09-16 RX ORDER — IBUPROFEN 600 MG/1
600 TABLET, FILM COATED ORAL ONCE
Status: COMPLETED | OUTPATIENT
Start: 2024-09-16 | End: 2024-09-16

## 2024-09-16 RX ADMIN — IBUPROFEN 600 MG: 600 TABLET ORAL at 23:13

## 2024-09-16 ASSESSMENT — COLUMBIA-SUICIDE SEVERITY RATING SCALE - C-SSRS
1. IN THE PAST MONTH, HAVE YOU WISHED YOU WERE DEAD OR WISHED YOU COULD GO TO SLEEP AND NOT WAKE UP?: NO
6. HAVE YOU EVER DONE ANYTHING, STARTED TO DO ANYTHING, OR PREPARED TO DO ANYTHING TO END YOUR LIFE?: NO
2. HAVE YOU ACTUALLY HAD ANY THOUGHTS OF KILLING YOURSELF IN THE PAST MONTH?: NO

## 2024-09-16 NOTE — LETTER
September 17, 2024      To Whom It May Concern:      Todd Bowden was seen in our Emergency Department today, 09/17/24.  I expect his condition to improve over the next day.  He may return to work as soon as 09/18/2024.    Sincerely,        OPAL Malone

## 2024-09-17 ENCOUNTER — APPOINTMENT (OUTPATIENT)
Dept: GENERAL RADIOLOGY | Facility: CLINIC | Age: 54
End: 2024-09-17
Attending: EMERGENCY MEDICINE

## 2024-09-17 VITALS
WEIGHT: 170 LBS | TEMPERATURE: 97.5 F | SYSTOLIC BLOOD PRESSURE: 118 MMHG | HEART RATE: 74 BPM | OXYGEN SATURATION: 96 % | BODY MASS INDEX: 24.39 KG/M2 | DIASTOLIC BLOOD PRESSURE: 82 MMHG | RESPIRATION RATE: 23 BRPM

## 2024-09-17 LAB
ALBUMIN SERPL BCG-MCNC: 4 G/DL (ref 3.5–5.2)
ALP SERPL-CCNC: 85 U/L (ref 40–150)
ALT SERPL W P-5'-P-CCNC: 14 U/L (ref 0–70)
ANION GAP SERPL CALCULATED.3IONS-SCNC: 7 MMOL/L (ref 7–15)
AST SERPL W P-5'-P-CCNC: 20 U/L (ref 0–45)
ATRIAL RATE - MUSE: 101 BPM
BASOPHILS # BLD AUTO: 0 10E3/UL (ref 0–0.2)
BASOPHILS NFR BLD AUTO: 0 %
BILIRUB SERPL-MCNC: 0.3 MG/DL
BUN SERPL-MCNC: 7.6 MG/DL (ref 6–20)
CALCIUM SERPL-MCNC: 9.1 MG/DL (ref 8.8–10.4)
CHLORIDE SERPL-SCNC: 101 MMOL/L (ref 98–107)
CREAT SERPL-MCNC: 0.84 MG/DL (ref 0.67–1.17)
DIASTOLIC BLOOD PRESSURE - MUSE: NORMAL MMHG
EGFRCR SERPLBLD CKD-EPI 2021: >90 ML/MIN/1.73M2
EOSINOPHIL # BLD AUTO: 0.1 10E3/UL (ref 0–0.7)
EOSINOPHIL NFR BLD AUTO: 3 %
ERYTHROCYTE [DISTWIDTH] IN BLOOD BY AUTOMATED COUNT: 13.2 % (ref 10–15)
GLUCOSE SERPL-MCNC: 168 MG/DL (ref 70–99)
HCO3 SERPL-SCNC: 27 MMOL/L (ref 22–29)
HCT VFR BLD AUTO: 38.9 % (ref 40–53)
HGB BLD-MCNC: 13.6 G/DL (ref 13.3–17.7)
IMM GRANULOCYTES # BLD: 0 10E3/UL
IMM GRANULOCYTES NFR BLD: 0 %
INTERPRETATION ECG - MUSE: NORMAL
LYMPHOCYTES # BLD AUTO: 1.1 10E3/UL (ref 0.8–5.3)
LYMPHOCYTES NFR BLD AUTO: 20 %
MAGNESIUM SERPL-MCNC: 2.2 MG/DL (ref 1.7–2.3)
MCH RBC QN AUTO: 33 PG (ref 26.5–33)
MCHC RBC AUTO-ENTMCNC: 35 G/DL (ref 31.5–36.5)
MCV RBC AUTO: 94 FL (ref 78–100)
MONOCYTES # BLD AUTO: 0.5 10E3/UL (ref 0–1.3)
MONOCYTES NFR BLD AUTO: 8 %
NEUTROPHILS # BLD AUTO: 4 10E3/UL (ref 1.6–8.3)
NEUTROPHILS NFR BLD AUTO: 69 %
NRBC # BLD AUTO: 0 10E3/UL
NRBC BLD AUTO-RTO: 0 /100
P AXIS - MUSE: 59 DEGREES
PLAT MORPH BLD: NORMAL
PLATELET # BLD AUTO: 224 10E3/UL (ref 150–450)
POTASSIUM SERPL-SCNC: 3.3 MMOL/L (ref 3.4–5.3)
PR INTERVAL - MUSE: 144 MS
PROT SERPL-MCNC: 6.7 G/DL (ref 6.4–8.3)
QRS DURATION - MUSE: 94 MS
QT - MUSE: 360 MS
QTC - MUSE: 466 MS
R AXIS - MUSE: 28 DEGREES
RBC # BLD AUTO: 4.12 10E6/UL (ref 4.4–5.9)
RBC MORPH BLD: NORMAL
SODIUM SERPL-SCNC: 135 MMOL/L (ref 135–145)
SYSTOLIC BLOOD PRESSURE - MUSE: NORMAL MMHG
T AXIS - MUSE: 68 DEGREES
TSH SERPL DL<=0.005 MIU/L-ACNC: 1.16 UIU/ML (ref 0.3–4.2)
VENTRICULAR RATE- MUSE: 101 BPM
WBC # BLD AUTO: 5.7 10E3/UL (ref 4–11)

## 2024-09-17 PROCEDURE — 73630 X-RAY EXAM OF FOOT: CPT | Mod: 50

## 2024-09-17 PROCEDURE — 84443 ASSAY THYROID STIM HORMONE: CPT | Performed by: EMERGENCY MEDICINE

## 2024-09-17 PROCEDURE — 36415 COLL VENOUS BLD VENIPUNCTURE: CPT | Performed by: EMERGENCY MEDICINE

## 2024-09-17 PROCEDURE — 85025 COMPLETE CBC W/AUTO DIFF WBC: CPT | Performed by: EMERGENCY MEDICINE

## 2024-09-17 PROCEDURE — 80053 COMPREHEN METABOLIC PANEL: CPT | Performed by: EMERGENCY MEDICINE

## 2024-09-17 PROCEDURE — 250N000013 HC RX MED GY IP 250 OP 250 PS 637: Performed by: EMERGENCY MEDICINE

## 2024-09-17 PROCEDURE — 83735 ASSAY OF MAGNESIUM: CPT | Performed by: EMERGENCY MEDICINE

## 2024-09-17 PROCEDURE — 250N000012 HC RX MED GY IP 250 OP 636 PS 637: Performed by: EMERGENCY MEDICINE

## 2024-09-17 RX ORDER — POTASSIUM CHLORIDE 1500 MG/1
20 TABLET, EXTENDED RELEASE ORAL ONCE
Status: COMPLETED | OUTPATIENT
Start: 2024-09-17 | End: 2024-09-17

## 2024-09-17 RX ORDER — PREDNISONE 20 MG/1
TABLET ORAL
Qty: 10 TABLET | Refills: 0 | Status: SHIPPED | OUTPATIENT
Start: 2024-09-17

## 2024-09-17 RX ORDER — PREDNISONE 20 MG/1
20 TABLET ORAL ONCE
Status: COMPLETED | OUTPATIENT
Start: 2024-09-17 | End: 2024-09-17

## 2024-09-17 RX ADMIN — PREDNISONE 20 MG: 20 TABLET ORAL at 03:01

## 2024-09-17 RX ADMIN — POTASSIUM CHLORIDE 20 MEQ: 1500 TABLET, EXTENDED RELEASE ORAL at 03:01

## 2024-09-17 ASSESSMENT — ACTIVITIES OF DAILY LIVING (ADL)
ADLS_ACUITY_SCORE: 37

## 2024-09-17 NOTE — ED PROVIDER NOTES
Emergency Department Note      History of Present Illness     Chief Complaint   Palpitations and Generalized Body Aches      HPI   Todd Bowden is a 54 year old male with a history as noted below who presents to the ED today for evaluation of generalized body aches. The patient reports he has diffuse pain throughout his whole body. He also reports bilateral great toe swelling in his first and second toes on this left foot that started in the past couple of days. He has treated this with epsom salt in the past which hasn't been helping today. He also reports a subjective fever. He isn't on chronic steroids.  Patient reports that steroids has helped his symptoms in the past.    Independent Historian   None    Review of External Notes   Was seen at urgent care on 9/13/2024 for leg swelling.    Past Medical History     Medical History and Problem List   Acute renal failure  Gonorrhea  Meningitis  Myocardial infarction   Polyarthritis   Lj's syndrome  Sepsis  Sickle cell anemia  Skin desquamation  Stress-induced cardiomyopathy  Atrial septal defect  HSV  Insomnia  Tinea pedis     Medications   Valium  Benadryl  Doxycycline hyclate  Hydroxychloroquine  Meloxicam  Prednisone  Tamsulosin  Trazodone  Zolpidem  Pantoprazole  Methylprednisolone  Lisinopril   Carvedilol  [place]  Varenicline     Surgical History   Cvl coronary angiogram    Physical Exam     No data found.    Physical Exam  General: Well-nourished, resting comfortably when I enter the room  Eyes: Pupils equal, conjunctivae pink no scleral icterus or conjunctival injection  ENT:  Moist mucus membranes  Respiratory:  Lungs clear to auscultation bilaterally, no crackles/rubs/wheezes.  Good air movement  CV: Normal rate and rhythm, no murmurs  GI:  Abdomen soft and non-distended.  No tenderness, guarding or rebound  Skin: Warm, dry.  No rashes or petechiae  Musculoskeletal: No calf swelling or tenderness..  Bilateral toes, especially the first, second, third  toes have some slight swelling.  They are soft, with some tenderness to palpation.  Neuro: Alert and oriented to person/place/time  Psychiatric: Normal affect    Diagnostics     Lab Results   Labs Ordered and Resulted from Time of ED Arrival to Time of ED Departure   COMPREHENSIVE METABOLIC PANEL - Abnormal       Result Value    Sodium 135      Potassium 3.3 (*)     Carbon Dioxide (CO2) 27      Anion Gap 7      Urea Nitrogen 7.6      Creatinine 0.84      GFR Estimate >90      Calcium 9.1      Chloride 101      Glucose 168 (*)     Alkaline Phosphatase 85      AST 20      ALT 14      Protein Total 6.7      Albumin 4.0      Bilirubin Total 0.3     CBC WITH PLATELETS AND DIFFERENTIAL - Abnormal    WBC Count 5.7      RBC Count 4.12 (*)     Hemoglobin 13.6      Hematocrit 38.9 (*)     MCV 94      MCH 33.0      MCHC 35.0      RDW 13.2      Platelet Count 224      % Neutrophils 69      % Lymphocytes 20      % Monocytes 8      % Eosinophils 3      % Basophils 0      % Immature Granulocytes 0      NRBCs per 100 WBC 0      Absolute Neutrophils 4.0      Absolute Lymphocytes 1.1      Absolute Monocytes 0.5      Absolute Eosinophils 0.1      Absolute Basophils 0.0      Absolute Immature Granulocytes 0.0      Absolute NRBCs 0.0     MAGNESIUM - Normal    Magnesium 2.2     TSH WITH FREE T4 REFLEX - Normal    TSH 1.16     RBC AND PLATELET MORPHOLOGY    RBC Morphology Confirmed RBC Indices      Platelet Assessment        Value: Automated Count Confirmed. Platelet morphology is normal.       Imaging   XR Foot Bilateral G/E 3 Views   Final Result   IMPRESSION: Normal joint spaces and alignment. No erosive change, fracture or dislocation.            Independent Interpretation   X-rays do not show any fracture or dislocation.    ED Course      Medications Administered   Medications   ibuprofen (ADVIL/MOTRIN) tablet 600 mg (600 mg Oral $Given 9/16/24 9665)   predniSONE (DELTASONE) tablet 20 mg (20 mg Oral $Given 9/17/24 0301)   potassium  chloride senia ER (KLOR-CON M20) CR tablet 20 mEq (20 mEq Oral $Given 9/17/24 0301)     Procedures   Procedures     Discussion of Management   None    ED Course   ED Course as of 09/19/24 1829   Tue Sep 17, 2024   0121 I obtained history and examined the patient as noted above.        Additional Documentation  None    Medical Decision Making / Diagnosis     CMS Diagnoses: None    MIPS       None    MDM   Todd Bowden is a 54 year old male with a history of RA and polyarthritis presents to the emergency department with a complaint of bilateral feet swelling and generalized pain.  Patient also reports having some heart palpitations intermittently.  Patient is a very difficult historian.  On exam patient is not in any acute distress, I wake him up from sleeping.  Vital signs are within acceptable limits, the patient is not tachycardic or hypotensive or febrile.  His first, second, third toes on his bilateral feet do appear slightly swollen, they are tender to palpation.  They are not tight, they are very soft.  They do have clubbing of the nails.  Brisk capillary refill.  They are warm and pink.  No signs of cellulitis or abscess.  No signs of ischemia or compartment syndrome.  No tenderness to palpation of the calves, low suspicion for DVT.  Workup is overall reassuring, no signs of leukocytosis.  Potassium is slightly low which is replaced.  Otherwise electrolytes are all within acceptable limits.  Patient does not appear to be in thyroid emergency.  The patient sleeps through his whole ER visit except when I go in and wake him up to examine him and give him results.  He is not in any acute distress.  Once I have discharged the patient, he also reports that he is having a throbbing pain in his left buttocks.  This is examined with the nurse present as chaperone.  He has some tenderness in his left gluteal crease, but there is no swelling, redness, rashes, masses.  Patient sits down on the hospital bed without any  complaints.  I have low suspicion for abscess or cellulitis.  This may just be musculoskeletal.  I am going to prescribe the patient some prednisone for his pain in his feet as this has helped before and he has a history of rheumatoid arthritis.  Patient is discharged home.    Disposition   The patient was discharged.     Diagnosis     ICD-10-CM    1. Palpitations  R00.2       2. Pain in both feet  M79.671     M79.672       3. Hypokalemia  E87.6            Discharge Medications   Discharge Medication List as of 9/17/2024  3:04 AM            Scribe Disclosure:  I, Stormy Roe, am serving as a scribe at 1:30 AM on 9/17/2024 to document services personally performed by Erica Villa MD based on my observations and the provider's statements to me.        Erica Villa MD  09/19/24 [id number]

## 2024-09-17 NOTE — DISCHARGE INSTRUCTIONS
You can take prednisone for the next 5 days.  Please return to the emergency department if your feet continue to swell, there is any redness, or you start having uncontrollable nausea or vomiting, or fevers.    Discharge Instructions  Palpitations    Palpitations are an unusual awareness of your heartbeat. People often describe this as the heart skipping, fluttering, racing, irregular, or pounding. At this time, your provider has found no signs that your palpitations are due to a serious or life-threatening condition. However, sometimes there is a serious problem that does not show up right away.    Palpitations can be caused by caffeine, cigarettes, diet pills, energy drinks or supplements, other stimulants, and medications and street drugs. They can also be caused by anxiety, hormone conditions such as high thyroid, and other medical conditions. Sometimes they are a sign of abnormal rhythm in the heart. At this time, your provider did not find any dangerous cause of your symptoms.    Generally, every Emergency Department visit should have a follow-up clinic visit with either a primary or a specialty clinic/provider. Please follow-up as instructed by your emergency provider today.    Return to the Emergency Department if:  You get chest pain or tightness.  You are short of breath.  You get very weak or tired.  You pass out or faint.  Your heart rate is over 120 beats per minute for more than 10 minutes while you are resting.   You have anything else that worries you.    What can I do to help myself?  Fill any prescriptions the provider gave you and take them right away.   Follow your provider s instructions about the prescription medicines you are on. Sometimes the provider may tell you to stop taking a medicine or change the dose.  If you smoke, this may be a good time to quit! The less you can smoke, the better.  Do not use energy drinks, diet pills, or stimulants. Limit your use of caffeine.  If you were given a  prescription for medicine here today, be sure to read all of the information (including the package insert) that comes with your prescription.  This will include important information about the medicine, its side effects, and any warnings that you need to know about.  The pharmacist who fills the prescription can provide more information and answer questions you may have about the medicine.  If you have questions or concerns that the pharmacist cannot address, please call or return to the Emergency Department.     Remember that you can always come back to the Emergency Department if you are not able to see your regular provider in the amount of time listed above, if you get any new symptoms, or if there is anything that worries you.

## 2024-09-17 NOTE — ED TRIAGE NOTES
"Pain reports history of meningitis in the past with chronic pain and reports pain \"everywhere\" with swelling in his feet. Pt states he usually will get a course of steroids that will help but have not have them for a while. Pt also reports palpitations like his heart his racing.      Triage Assessment (Adult)       Row Name 09/16/24 8771          Triage Assessment    Airway WDL WDL        Respiratory WDL    Respiratory WDL WDL        Skin Circulation/Temperature WDL    Skin Circulation/Temperature WDL WDL        Cardiac WDL    Cardiac WDL X;rhythm     Pulse Rate & Regularity tachycardic        Peripheral/Neurovascular WDL    Peripheral Neurovascular WDL WDL        Cognitive/Neuro/Behavioral WDL    Cognitive/Neuro/Behavioral WDL WDL                     "